# Patient Record
Sex: MALE | Race: WHITE | Employment: OTHER | URBAN - METROPOLITAN AREA
[De-identification: names, ages, dates, MRNs, and addresses within clinical notes are randomized per-mention and may not be internally consistent; named-entity substitution may affect disease eponyms.]

---

## 2017-01-05 RX ORDER — PANTOPRAZOLE SODIUM 20 MG/1
TABLET, DELAYED RELEASE ORAL
Qty: 30 TABLET | Refills: 0 | Status: SHIPPED | OUTPATIENT
Start: 2017-01-05 | End: 2017-03-09 | Stop reason: SDUPTHER

## 2017-01-06 ENCOUNTER — TELEPHONE (OUTPATIENT)
Dept: SURGERY | Age: 47
End: 2017-01-06

## 2017-02-14 ENCOUNTER — HOSPITAL ENCOUNTER (OUTPATIENT)
Dept: PREADMISSION TESTING | Age: 47
Discharge: HOME | End: 2017-02-14
Attending: SURGERY | Admitting: SURGERY

## 2017-02-14 VITALS — BODY MASS INDEX: 25.67 KG/M2 | HEIGHT: 74 IN | WEIGHT: 200 LBS

## 2017-02-14 RX ORDER — CIPROFLOXACIN 2 MG/ML
400 INJECTION, SOLUTION INTRAVENOUS ONCE
Status: CANCELLED | OUTPATIENT
Start: 2017-02-14

## 2017-02-21 ENCOUNTER — HOSPITAL ENCOUNTER (OUTPATIENT)
Dept: SURGERY | Age: 47
Discharge: HOME | End: 2017-02-21
Admitting: SURGERY

## 2017-02-21 VITALS
WEIGHT: 200 LBS | HEART RATE: 93 BPM | HEIGHT: 74 IN | DIASTOLIC BLOOD PRESSURE: 88 MMHG | TEMPERATURE: 97.3 F | OXYGEN SATURATION: 90 % | SYSTOLIC BLOOD PRESSURE: 145 MMHG | RESPIRATION RATE: 16 BRPM | BODY MASS INDEX: 25.67 KG/M2

## 2017-02-21 LAB
A/G RATIO: 0.9 (ref 1.1–2.2)
ALBUMIN SERPL-MCNC: 3.1 G/DL (ref 3.4–5)
ALP BLD-CCNC: 53 U/L (ref 40–129)
ALT SERPL-CCNC: 58 U/L (ref 10–40)
ANION GAP SERPL CALCULATED.3IONS-SCNC: 11 MMOL/L (ref 3–16)
AST SERPL-CCNC: 162 U/L (ref 15–37)
BILIRUB SERPL-MCNC: 4 MG/DL (ref 0–1)
BUN BLDV-MCNC: 19 MG/DL (ref 7–20)
CALCIUM SERPL-MCNC: 8.9 MG/DL (ref 8.3–10.6)
CHLORIDE BLD-SCNC: 106 MMOL/L (ref 99–110)
CO2: 21 MMOL/L (ref 21–32)
CREAT SERPL-MCNC: 0.8 MG/DL (ref 0.9–1.3)
GFR AFRICAN AMERICAN: >60
GFR NON-AFRICAN AMERICAN: >60
GLOBULIN: 3.3 G/DL
GLUCOSE BLD-MCNC: 86 MG/DL (ref 70–99)
HCT VFR BLD CALC: 34.2 % (ref 40.5–52.5)
HEMOGLOBIN: 11.4 G/DL (ref 13.5–17.5)
INR BLD: 1.23 (ref 0.85–1.15)
MCH RBC QN AUTO: 31.7 PG (ref 26–34)
MCHC RBC AUTO-ENTMCNC: 33.5 G/DL (ref 31–36)
MCV RBC AUTO: 94.6 FL (ref 80–100)
PDW BLD-RTO: 17.4 % (ref 12.4–15.4)
PLATELET # BLD: 101 K/UL (ref 135–450)
PMV BLD AUTO: 7 FL (ref 5–10.5)
POTASSIUM SERPL-SCNC: 4.1 MMOL/L (ref 3.5–5.1)
PROTHROMBIN TIME: 13.9 SEC (ref 9.6–13)
RBC # BLD: 3.62 M/UL (ref 4.2–5.9)
SODIUM BLD-SCNC: 138 MMOL/L (ref 136–145)
TOTAL PROTEIN: 6.4 G/DL (ref 6.4–8.2)
WBC # BLD: 4.9 K/UL (ref 4–11)

## 2017-02-21 PROCEDURE — 46615 ANOSCOPY: CPT | Performed by: SURGERY

## 2017-02-21 PROCEDURE — 93010 ELECTROCARDIOGRAM REPORT: CPT | Performed by: INTERNAL MEDICINE

## 2017-02-21 PROCEDURE — 46910 DESTRUCTION ANAL LESION(S): CPT | Performed by: SURGERY

## 2017-02-21 RX ORDER — CIPROFLOXACIN 2 MG/ML
400 INJECTION, SOLUTION INTRAVENOUS ONCE
Status: DISCONTINUED | OUTPATIENT
Start: 2017-02-21 | End: 2017-02-22 | Stop reason: HOSPADM

## 2017-02-21 RX ORDER — MIDAZOLAM HYDROCHLORIDE 1 MG/ML
INJECTION INTRAMUSCULAR; INTRAVENOUS
Status: COMPLETED
Start: 2017-02-21 | End: 2017-02-21

## 2017-02-21 RX ORDER — PROMETHAZINE HYDROCHLORIDE 25 MG/ML
6.25 INJECTION, SOLUTION INTRAMUSCULAR; INTRAVENOUS
Status: ACTIVE | OUTPATIENT
Start: 2017-02-21 | End: 2017-02-21

## 2017-02-21 RX ORDER — OXYCODONE HYDROCHLORIDE AND ACETAMINOPHEN 5; 325 MG/1; MG/1
1 TABLET ORAL EVERY 6 HOURS PRN
Qty: 35 TABLET | Refills: 0 | Status: SHIPPED | OUTPATIENT
Start: 2017-02-21 | End: 2017-03-07

## 2017-02-21 RX ORDER — ONDANSETRON 2 MG/ML
4 INJECTION INTRAMUSCULAR; INTRAVENOUS
Status: ACTIVE | OUTPATIENT
Start: 2017-02-21 | End: 2017-02-21

## 2017-02-21 RX ORDER — SODIUM CHLORIDE, SODIUM LACTATE, POTASSIUM CHLORIDE, CALCIUM CHLORIDE 600; 310; 30; 20 MG/100ML; MG/100ML; MG/100ML; MG/100ML
INJECTION, SOLUTION INTRAVENOUS CONTINUOUS
Status: DISCONTINUED | OUTPATIENT
Start: 2017-02-21 | End: 2017-02-22 | Stop reason: HOSPADM

## 2017-02-21 RX ORDER — DIPHENHYDRAMINE HYDROCHLORIDE 50 MG/ML
12.5 INJECTION INTRAMUSCULAR; INTRAVENOUS
Status: ACTIVE | OUTPATIENT
Start: 2017-02-21 | End: 2017-02-21

## 2017-02-21 RX ORDER — MIDAZOLAM HYDROCHLORIDE 1 MG/ML
2 INJECTION INTRAMUSCULAR; INTRAVENOUS ONCE
Status: COMPLETED | OUTPATIENT
Start: 2017-02-21 | End: 2017-02-21

## 2017-02-21 RX ORDER — HYDRALAZINE HYDROCHLORIDE 20 MG/ML
5 INJECTION INTRAMUSCULAR; INTRAVENOUS EVERY 10 MIN PRN
Status: DISCONTINUED | OUTPATIENT
Start: 2017-02-21 | End: 2017-02-22 | Stop reason: HOSPADM

## 2017-02-21 RX ORDER — LABETALOL HYDROCHLORIDE 5 MG/ML
5 INJECTION, SOLUTION INTRAVENOUS EVERY 10 MIN PRN
Status: DISCONTINUED | OUTPATIENT
Start: 2017-02-21 | End: 2017-02-22 | Stop reason: HOSPADM

## 2017-02-21 RX ORDER — DOCUSATE SODIUM 100 MG/1
100 CAPSULE, LIQUID FILLED ORAL 2 TIMES DAILY PRN
Qty: 60 CAPSULE | Refills: 1 | Status: SHIPPED | OUTPATIENT
Start: 2017-02-21 | End: 2018-09-10 | Stop reason: ALTCHOICE

## 2017-02-21 RX ORDER — FENTANYL CITRATE 50 UG/ML
25 INJECTION, SOLUTION INTRAMUSCULAR; INTRAVENOUS EVERY 5 MIN PRN
Status: DISCONTINUED | OUTPATIENT
Start: 2017-02-21 | End: 2017-02-22 | Stop reason: HOSPADM

## 2017-02-21 RX ORDER — MEPERIDINE HYDROCHLORIDE 25 MG/ML
12.5 INJECTION INTRAMUSCULAR; INTRAVENOUS; SUBCUTANEOUS EVERY 5 MIN PRN
Status: DISCONTINUED | OUTPATIENT
Start: 2017-02-21 | End: 2017-02-22 | Stop reason: HOSPADM

## 2017-02-21 RX ADMIN — SODIUM CHLORIDE, SODIUM LACTATE, POTASSIUM CHLORIDE, CALCIUM CHLORIDE: 600; 310; 30; 20 INJECTION, SOLUTION INTRAVENOUS at 08:39

## 2017-02-21 RX ADMIN — MIDAZOLAM HYDROCHLORIDE 2 MG: 1 INJECTION INTRAMUSCULAR; INTRAVENOUS at 08:43

## 2017-02-21 ASSESSMENT — PAIN SCALES - GENERAL: PAINLEVEL_OUTOF10: 0

## 2017-02-21 ASSESSMENT — PAIN - FUNCTIONAL ASSESSMENT: PAIN_FUNCTIONAL_ASSESSMENT: 0-10

## 2017-02-22 ENCOUNTER — OFFICE VISIT (OUTPATIENT)
Dept: NEUROLOGY | Age: 47
End: 2017-02-22

## 2017-02-22 VITALS
WEIGHT: 212 LBS | HEIGHT: 74 IN | DIASTOLIC BLOOD PRESSURE: 86 MMHG | BODY MASS INDEX: 27.21 KG/M2 | HEART RATE: 80 BPM | SYSTOLIC BLOOD PRESSURE: 134 MMHG

## 2017-02-22 DIAGNOSIS — G25.81 RESTLESS LEGS SYNDROME (RLS): Primary | ICD-10-CM

## 2017-02-22 DIAGNOSIS — B20 HUMAN IMMUNODEFICIENCY VIRUS (HIV) DISEASE (HCC): Chronic | ICD-10-CM

## 2017-02-22 DIAGNOSIS — G47.33 OBSTRUCTIVE APNEA: Chronic | ICD-10-CM

## 2017-02-22 LAB
EKG ATRIAL RATE: 97 BPM
EKG DIAGNOSIS: NORMAL
EKG P AXIS: 46 DEGREES
EKG P-R INTERVAL: 144 MS
EKG Q-T INTERVAL: 388 MS
EKG QRS DURATION: 94 MS
EKG QTC CALCULATION (BAZETT): 492 MS
EKG R AXIS: -27 DEGREES
EKG T AXIS: 44 DEGREES
EKG VENTRICULAR RATE: 97 BPM

## 2017-02-22 PROCEDURE — 99244 OFF/OP CNSLTJ NEW/EST MOD 40: CPT | Performed by: PSYCHIATRY & NEUROLOGY

## 2017-02-22 RX ORDER — GABAPENTIN 300 MG/1
300 CAPSULE ORAL NIGHTLY
Qty: 30 CAPSULE | Refills: 0 | Status: SHIPPED | OUTPATIENT
Start: 2017-02-22 | End: 2017-03-28 | Stop reason: SDUPTHER

## 2017-03-01 ENCOUNTER — OFFICE VISIT (OUTPATIENT)
Dept: SURGERY | Age: 47
End: 2017-03-01

## 2017-03-01 VITALS
DIASTOLIC BLOOD PRESSURE: 84 MMHG | SYSTOLIC BLOOD PRESSURE: 149 MMHG | HEIGHT: 74 IN | BODY MASS INDEX: 25.8 KG/M2 | HEART RATE: 102 BPM | WEIGHT: 201 LBS

## 2017-03-01 DIAGNOSIS — B20 HIV (HUMAN IMMUNODEFICIENCY VIRUS INFECTION) (HCC): Primary | ICD-10-CM

## 2017-03-01 PROCEDURE — 99024 POSTOP FOLLOW-UP VISIT: CPT | Performed by: SURGERY

## 2017-03-09 RX ORDER — PANTOPRAZOLE SODIUM 20 MG/1
TABLET, DELAYED RELEASE ORAL
Qty: 30 TABLET | Refills: 0 | Status: SHIPPED | OUTPATIENT
Start: 2017-03-09

## 2017-03-21 ENCOUNTER — HOSPITAL ENCOUNTER (OUTPATIENT)
Dept: CT IMAGING | Age: 47
Discharge: HOME | End: 2017-03-21
Attending: UROLOGY | Admitting: UROLOGY

## 2017-03-21 ENCOUNTER — OFFICE VISIT (OUTPATIENT)
Dept: SLEEP MEDICINE | Age: 47
End: 2017-03-21

## 2017-03-21 VITALS
WEIGHT: 207 LBS | DIASTOLIC BLOOD PRESSURE: 70 MMHG | OXYGEN SATURATION: 98 % | SYSTOLIC BLOOD PRESSURE: 130 MMHG | BODY MASS INDEX: 26.56 KG/M2 | HEIGHT: 74 IN | HEART RATE: 93 BPM

## 2017-03-21 DIAGNOSIS — I48.91 ATRIAL FIBRILLATION WITH RVR (HCC): Chronic | ICD-10-CM

## 2017-03-21 DIAGNOSIS — G47.33 OBSTRUCTIVE SLEEP APNEA SYNDROME: Primary | Chronic | ICD-10-CM

## 2017-03-21 DIAGNOSIS — G47.00 INSOMNIA, UNSPECIFIED TYPE: Chronic | ICD-10-CM

## 2017-03-21 DIAGNOSIS — N20.0 URIC ACID NEPHROLITHIASIS: ICD-10-CM

## 2017-03-21 DIAGNOSIS — G25.81 RLS (RESTLESS LEGS SYNDROME): Chronic | ICD-10-CM

## 2017-03-21 PROCEDURE — 99213 OFFICE O/P EST LOW 20 MIN: CPT | Performed by: INTERNAL MEDICINE

## 2017-03-21 ASSESSMENT — ENCOUNTER SYMPTOMS
PHOTOPHOBIA: 0
SHORTNESS OF BREATH: 0
STRIDOR: 0
CHEST TIGHTNESS: 0
SINUS PRESSURE: 0
APNEA: 1
EYE PAIN: 0

## 2017-03-21 ASSESSMENT — SLEEP AND FATIGUE QUESTIONNAIRES
HOW LIKELY ARE YOU TO NOD OFF OR FALL ASLEEP WHILE LYING DOWN TO REST IN THE AFTERNOON WHEN CIRCUMSTANCES PERMIT: 2
HOW LIKELY ARE YOU TO NOD OFF OR FALL ASLEEP WHILE WATCHING TV: 1
HOW LIKELY ARE YOU TO NOD OFF OR FALL ASLEEP IN A CAR, WHILE STOPPED FOR A FEW MINUTES IN TRAFFIC: 0
HOW LIKELY ARE YOU TO NOD OFF OR FALL ASLEEP WHILE SITTING QUIETLY AFTER LUNCH WITHOUT ALCOHOL: 0
HOW LIKELY ARE YOU TO NOD OFF OR FALL ASLEEP WHILE SITTING AND READING: 1
HOW LIKELY ARE YOU TO NOD OFF OR FALL ASLEEP WHEN YOU ARE A PASSENGER IN A CAR FOR AN HOUR WITHOUT A BREAK: 0
ESS TOTAL SCORE: 4
HOW LIKELY ARE YOU TO NOD OFF OR FALL ASLEEP WHILE SITTING INACTIVE IN A PUBLIC PLACE: 0
HOW LIKELY ARE YOU TO NOD OFF OR FALL ASLEEP WHILE SITTING AND TALKING TO SOMEONE: 0

## 2017-03-24 ENCOUNTER — PROCEDURE VISIT (OUTPATIENT)
Dept: NEUROLOGY | Age: 47
End: 2017-03-24

## 2017-03-24 DIAGNOSIS — G60.9 IDIOPATHIC PERIPHERAL NEUROPATHY: Primary | ICD-10-CM

## 2017-03-24 PROCEDURE — 95886 MUSC TEST DONE W/N TEST COMP: CPT | Performed by: PSYCHIATRY & NEUROLOGY

## 2017-03-24 PROCEDURE — 95910 NRV CNDJ TEST 7-8 STUDIES: CPT | Performed by: PSYCHIATRY & NEUROLOGY

## 2017-04-05 ENCOUNTER — TELEPHONE (OUTPATIENT)
Dept: NEUROLOGY | Age: 47
End: 2017-04-05

## 2017-04-28 ENCOUNTER — OFFICE VISIT (OUTPATIENT)
Dept: NEUROLOGY | Age: 47
End: 2017-04-28

## 2017-04-28 VITALS
WEIGHT: 200.4 LBS | SYSTOLIC BLOOD PRESSURE: 140 MMHG | BODY MASS INDEX: 25.72 KG/M2 | DIASTOLIC BLOOD PRESSURE: 86 MMHG | HEIGHT: 74 IN

## 2017-04-28 DIAGNOSIS — G25.81 RESTLESS LEGS SYNDROME (RLS): ICD-10-CM

## 2017-04-28 DIAGNOSIS — F51.01 PRIMARY INSOMNIA: ICD-10-CM

## 2017-04-28 DIAGNOSIS — B20 HUMAN IMMUNODEFICIENCY VIRUS (HIV) DISEASE (HCC): Chronic | ICD-10-CM

## 2017-04-28 DIAGNOSIS — G60.9 IDIOPATHIC PERIPHERAL NEUROPATHY: Primary | ICD-10-CM

## 2017-04-28 PROCEDURE — 99214 OFFICE O/P EST MOD 30 MIN: CPT | Performed by: PSYCHIATRY & NEUROLOGY

## 2017-04-28 RX ORDER — GABAPENTIN 300 MG/1
CAPSULE ORAL
Qty: 30 CAPSULE | Refills: 2 | Status: SHIPPED | OUTPATIENT
Start: 2017-04-28 | End: 2017-06-26

## 2017-05-23 ENCOUNTER — OFFICE VISIT (OUTPATIENT)
Dept: SLEEP MEDICINE | Age: 47
End: 2017-05-23

## 2017-05-23 VITALS
HEIGHT: 74 IN | OXYGEN SATURATION: 98 % | HEART RATE: 75 BPM | DIASTOLIC BLOOD PRESSURE: 70 MMHG | WEIGHT: 192 LBS | BODY MASS INDEX: 24.64 KG/M2 | SYSTOLIC BLOOD PRESSURE: 132 MMHG

## 2017-05-23 DIAGNOSIS — G47.00 INSOMNIA, UNSPECIFIED TYPE: Chronic | ICD-10-CM

## 2017-05-23 DIAGNOSIS — G25.81 RLS (RESTLESS LEGS SYNDROME): Chronic | ICD-10-CM

## 2017-05-23 DIAGNOSIS — I48.91 ATRIAL FIBRILLATION WITH RVR (HCC): Chronic | ICD-10-CM

## 2017-05-23 DIAGNOSIS — G47.33 OBSTRUCTIVE SLEEP APNEA SYNDROME: Primary | Chronic | ICD-10-CM

## 2017-05-23 PROCEDURE — 99214 OFFICE O/P EST MOD 30 MIN: CPT | Performed by: INTERNAL MEDICINE

## 2017-05-23 ASSESSMENT — SLEEP AND FATIGUE QUESTIONNAIRES
HOW LIKELY ARE YOU TO NOD OFF OR FALL ASLEEP WHILE SITTING QUIETLY AFTER LUNCH WITHOUT ALCOHOL: 0
HOW LIKELY ARE YOU TO NOD OFF OR FALL ASLEEP WHILE SITTING AND READING: 1
HOW LIKELY ARE YOU TO NOD OFF OR FALL ASLEEP WHILE LYING DOWN TO REST IN THE AFTERNOON WHEN CIRCUMSTANCES PERMIT: 3
HOW LIKELY ARE YOU TO NOD OFF OR FALL ASLEEP WHILE SITTING INACTIVE IN A PUBLIC PLACE: 0
ESS TOTAL SCORE: 6
HOW LIKELY ARE YOU TO NOD OFF OR FALL ASLEEP IN A CAR, WHILE STOPPED FOR A FEW MINUTES IN TRAFFIC: 0
HOW LIKELY ARE YOU TO NOD OFF OR FALL ASLEEP WHEN YOU ARE A PASSENGER IN A CAR FOR AN HOUR WITHOUT A BREAK: 0
HOW LIKELY ARE YOU TO NOD OFF OR FALL ASLEEP WHILE WATCHING TV: 2
HOW LIKELY ARE YOU TO NOD OFF OR FALL ASLEEP WHILE SITTING AND TALKING TO SOMEONE: 0

## 2017-05-23 ASSESSMENT — ENCOUNTER SYMPTOMS
SHORTNESS OF BREATH: 0
STRIDOR: 0
PHOTOPHOBIA: 0
CHEST TIGHTNESS: 0
SINUS PRESSURE: 0
EYE PAIN: 0

## 2017-06-26 ENCOUNTER — TELEPHONE (OUTPATIENT)
Dept: SLEEP MEDICINE | Age: 47
End: 2017-06-26

## 2017-06-26 RX ORDER — GABAPENTIN 300 MG/1
CAPSULE ORAL
Qty: 90 CAPSULE | Refills: 5 | Status: SHIPPED | OUTPATIENT
Start: 2017-06-26

## 2017-07-28 ENCOUNTER — OFFICE VISIT (OUTPATIENT)
Dept: NEUROLOGY | Age: 47
End: 2017-07-28

## 2017-07-28 VITALS
HEART RATE: 84 BPM | DIASTOLIC BLOOD PRESSURE: 86 MMHG | SYSTOLIC BLOOD PRESSURE: 140 MMHG | HEIGHT: 74 IN | BODY MASS INDEX: 25.93 KG/M2 | WEIGHT: 202 LBS

## 2017-07-28 DIAGNOSIS — G63 POLYNEUROPATHY ASSOCIATED WITH UNDERLYING DISEASE (HCC): Primary | ICD-10-CM

## 2017-07-28 DIAGNOSIS — G47.33 OBSTRUCTIVE APNEA: Chronic | ICD-10-CM

## 2017-07-28 DIAGNOSIS — B20 HUMAN IMMUNODEFICIENCY VIRUS (HIV) DISEASE (HCC): Chronic | ICD-10-CM

## 2017-07-28 PROCEDURE — 99213 OFFICE O/P EST LOW 20 MIN: CPT | Performed by: PSYCHIATRY & NEUROLOGY

## 2017-09-11 ENCOUNTER — OFFICE VISIT (OUTPATIENT)
Dept: SURGERY | Age: 47
End: 2017-09-11

## 2017-09-11 VITALS
SYSTOLIC BLOOD PRESSURE: 138 MMHG | DIASTOLIC BLOOD PRESSURE: 80 MMHG | HEIGHT: 74 IN | BODY MASS INDEX: 25.76 KG/M2 | WEIGHT: 200.7 LBS

## 2017-09-11 DIAGNOSIS — A63.0 CONDYLOMA: Primary | ICD-10-CM

## 2017-09-11 PROCEDURE — 99212 OFFICE O/P EST SF 10 MIN: CPT | Performed by: SURGERY

## 2017-09-19 ENCOUNTER — OFFICE VISIT (OUTPATIENT)
Dept: SLEEP MEDICINE | Age: 47
End: 2017-09-19

## 2017-09-19 VITALS
WEIGHT: 205 LBS | DIASTOLIC BLOOD PRESSURE: 68 MMHG | HEART RATE: 99 BPM | OXYGEN SATURATION: 97 % | HEIGHT: 74 IN | SYSTOLIC BLOOD PRESSURE: 130 MMHG | BODY MASS INDEX: 26.31 KG/M2

## 2017-09-19 DIAGNOSIS — G25.81 RLS (RESTLESS LEGS SYNDROME): Chronic | ICD-10-CM

## 2017-09-19 DIAGNOSIS — G47.00 INSOMNIA, UNSPECIFIED TYPE: Chronic | ICD-10-CM

## 2017-09-19 DIAGNOSIS — G47.33 OBSTRUCTIVE SLEEP APNEA SYNDROME: Primary | Chronic | ICD-10-CM

## 2017-09-19 DIAGNOSIS — I48.91 ATRIAL FIBRILLATION WITH RVR (HCC): Chronic | ICD-10-CM

## 2017-09-19 PROCEDURE — 99213 OFFICE O/P EST LOW 20 MIN: CPT | Performed by: INTERNAL MEDICINE

## 2017-09-19 ASSESSMENT — ENCOUNTER SYMPTOMS
SHORTNESS OF BREATH: 0
EYE PAIN: 0
PHOTOPHOBIA: 0
SINUS PRESSURE: 0
STRIDOR: 0
CHEST TIGHTNESS: 0

## 2017-09-19 ASSESSMENT — SLEEP AND FATIGUE QUESTIONNAIRES
ESS TOTAL SCORE: 2
HOW LIKELY ARE YOU TO NOD OFF OR FALL ASLEEP IN A CAR, WHILE STOPPED FOR A FEW MINUTES IN TRAFFIC: 0
HOW LIKELY ARE YOU TO NOD OFF OR FALL ASLEEP WHEN YOU ARE A PASSENGER IN A CAR FOR AN HOUR WITHOUT A BREAK: 0
HOW LIKELY ARE YOU TO NOD OFF OR FALL ASLEEP WHILE SITTING QUIETLY AFTER LUNCH WITHOUT ALCOHOL: 0
HOW LIKELY ARE YOU TO NOD OFF OR FALL ASLEEP WHILE SITTING AND TALKING TO SOMEONE: 0
HOW LIKELY ARE YOU TO NOD OFF OR FALL ASLEEP WHILE WATCHING TV: 1
HOW LIKELY ARE YOU TO NOD OFF OR FALL ASLEEP WHILE SITTING AND READING: 0
HOW LIKELY ARE YOU TO NOD OFF OR FALL ASLEEP WHILE SITTING INACTIVE IN A PUBLIC PLACE: 0
HOW LIKELY ARE YOU TO NOD OFF OR FALL ASLEEP WHILE LYING DOWN TO REST IN THE AFTERNOON WHEN CIRCUMSTANCES PERMIT: 1

## 2018-01-16 ENCOUNTER — OFFICE VISIT (OUTPATIENT)
Dept: SLEEP MEDICINE | Age: 48
End: 2018-01-16

## 2018-01-16 VITALS
WEIGHT: 205 LBS | OXYGEN SATURATION: 97 % | DIASTOLIC BLOOD PRESSURE: 70 MMHG | BODY MASS INDEX: 26.31 KG/M2 | SYSTOLIC BLOOD PRESSURE: 119 MMHG | HEART RATE: 91 BPM | HEIGHT: 74 IN

## 2018-01-16 DIAGNOSIS — I48.91 ATRIAL FIBRILLATION WITH RVR (HCC): Chronic | ICD-10-CM

## 2018-01-16 DIAGNOSIS — G47.00 INSOMNIA, UNSPECIFIED TYPE: Chronic | ICD-10-CM

## 2018-01-16 DIAGNOSIS — G47.33 OBSTRUCTIVE SLEEP APNEA SYNDROME: Primary | Chronic | ICD-10-CM

## 2018-01-16 DIAGNOSIS — G25.81 RLS (RESTLESS LEGS SYNDROME): Chronic | ICD-10-CM

## 2018-01-16 PROCEDURE — 99214 OFFICE O/P EST MOD 30 MIN: CPT | Performed by: INTERNAL MEDICINE

## 2018-01-16 RX ORDER — DOXEPIN HYDROCHLORIDE 6 MG/1
6 TABLET ORAL EVERY EVENING
Qty: 30 TABLET | Refills: 2 | Status: SHIPPED | OUTPATIENT
Start: 2018-01-16 | End: 2018-02-15

## 2018-01-16 ASSESSMENT — SLEEP AND FATIGUE QUESTIONNAIRES
HOW LIKELY ARE YOU TO NOD OFF OR FALL ASLEEP WHILE SITTING AND TALKING TO SOMEONE: 0
HOW LIKELY ARE YOU TO NOD OFF OR FALL ASLEEP WHILE SITTING INACTIVE IN A PUBLIC PLACE: 2
HOW LIKELY ARE YOU TO NOD OFF OR FALL ASLEEP WHILE WATCHING TV: 2
HOW LIKELY ARE YOU TO NOD OFF OR FALL ASLEEP IN A CAR, WHILE STOPPED FOR A FEW MINUTES IN TRAFFIC: 0
HOW LIKELY ARE YOU TO NOD OFF OR FALL ASLEEP WHILE LYING DOWN TO REST IN THE AFTERNOON WHEN CIRCUMSTANCES PERMIT: 3
HOW LIKELY ARE YOU TO NOD OFF OR FALL ASLEEP WHEN YOU ARE A PASSENGER IN A CAR FOR AN HOUR WITHOUT A BREAK: 1
HOW LIKELY ARE YOU TO NOD OFF OR FALL ASLEEP WHILE SITTING AND READING: 1
HOW LIKELY ARE YOU TO NOD OFF OR FALL ASLEEP WHILE SITTING QUIETLY AFTER LUNCH WITHOUT ALCOHOL: 2
ESS TOTAL SCORE: 11

## 2018-01-16 ASSESSMENT — ENCOUNTER SYMPTOMS
EYE PAIN: 0
SHORTNESS OF BREATH: 0
PHOTOPHOBIA: 0
APNEA: 1
ABDOMINAL DISTENTION: 0
SINUS PRESSURE: 0
STRIDOR: 0
ABDOMINAL PAIN: 0
CHEST TIGHTNESS: 0
NAUSEA: 0

## 2018-01-16 NOTE — PROGRESS NOTES
Sonya Damon MD, FAA, [facility]  Perla Newell Lover CNP [address]  3rd Floor, [address] [facility], [address] [phone]   [address] [place] [address] Dr Cid . [address] [phone]       [facility] Abiel Pinto SLEEP MEDICINE    Subjective:     Patient ID: Uvaldo Bishop is a 50 y.o. male. Chief Complaint   Patient presents with    Sleep Apnea       HPI:      MARIA M: still not on any therapy for his MARIA M and not willing to try PAP therapy again. Pt heard ads for [employer] and would like to get more info. Insomnia:  Still taking 600 mg neurontin which is not working as well as before. RLS:  Still taking 600 mg neurontin but feels it is not controlling his Sx as previous and now having Sx during the daytime. A fib: stable on meds and followed by pt's PCP and other physicians. [place] - Total score: 11    Social History     Social History    Marital status:      Spouse name: N/A    Number of children: 3    Years of education: N/A     Occupational History    retired      Social History Main Topics    Smoking status: Never Smoker    Smokeless tobacco: Never Used    Alcohol use Yes      Comment: once a month    Drug use: No    Sexual activity: Not on file     Other Topics Concern    Not on file     Social History Narrative    No narrative on file       Prior to Admission medications    Medication Sig Start Date End Date Taking?  Authorizing Provider   emtricitabine-tenofovir alafenamide (DESCOVY) 200-25 MG TABS tablet TAKE ONE TABLET BY MOUTH ONCE DAILY (REPLACES TRUVADA) 10/27/17  Yes Historical Provider, MD   gabapentin (NEURONTIN) 300 MG capsule 2-3 po qHS 6/26/17  Yes Suhail Renteria MD   pantoprazole (PROTONIX) 20 MG tablet TAKE ONE TABLET BY MOUTH DAILY 3/9/17  Yes Shiva Leonardo MD   docusate sodium (COLACE) 100 MG capsule Take 1 capsule by mouth 2 times daily as needed for Constipation 2/21/17  Yes Jax Bueno

## 2018-03-12 ENCOUNTER — OFFICE VISIT (OUTPATIENT)
Dept: SURGERY | Age: 48
End: 2018-03-12

## 2018-03-12 VITALS
HEIGHT: 74 IN | RESPIRATION RATE: 16 BRPM | DIASTOLIC BLOOD PRESSURE: 76 MMHG | WEIGHT: 208 LBS | SYSTOLIC BLOOD PRESSURE: 144 MMHG | BODY MASS INDEX: 26.69 KG/M2

## 2018-03-12 DIAGNOSIS — K62.82 AIN (ANAL INTRAEPITHELIAL NEOPLASIA) ANAL CANAL: Primary | ICD-10-CM

## 2018-03-12 PROCEDURE — 99212 OFFICE O/P EST SF 10 MIN: CPT | Performed by: SURGERY

## 2018-03-12 NOTE — PROGRESS NOTES
Subjective:     Patient is a 50 y.o. Man with AIN     HPI: No rectal lesions or bleeding noted. Hep C now thought to be cured but still with cirrhosis.      Patient Active Problem List    Diagnosis Date Noted    Human immunodeficiency virus (HIV) disease ([address] 04/10/2013     Priority: High    AIN (anal intraepithelial neoplasia) anal canal     Influenza 03/16/2016    Pneumonia 03/16/2016    Influenza A     SIRS (systemic inflammatory response syndrome) (HCC)     HIV (human immunodeficiency virus infection) ([address]     Elevated LFTs     Pancytopenia ([address]     Obstructive apnea     Distal radius fracture 11/03/2014    Atrial fibrillation with RVR ([address] 07/04/2014    Penicillin allergy 03/25/2014    Syphilis 03/25/2014    Neutropenia with fever ([address] 12/07/2013    Sepsis ([address] 12/07/2013    Depression 04/10/2013    Insomnia 04/10/2013    Nephrolithiasis 04/10/2013    Condyloma acuminata 04/10/2013    Fatigue 04/10/2013     Past Medical History:   Diagnosis Date    A-fib ([address] JULY 4 2014    POSSIBLE REACTION TO A MEDICATION    Depression     Hepatitis C     HIV (human immunodeficiency virus infection) ([address]     Hx of blood clots     x1    Influenza A 3/16/16    Kidney stones     Numbness and tingling in left arm     Pulmonary embolism ([address] 2007    Left lung    Sleep apnea     under going studies not definite    Syphilis, secondary     Thoracic outlet syndrome       Past Surgical History:   Procedure Laterality Date    ELBOW SURGERY Right     ULNAR NERVE    FIRST RIB REMOVAL      and extra rib removed    KNEE ARTHROSCOPY Left     OTHER SURGICAL HISTORY  06/03/2014    EXAMINATION UNDER ANESTHESIA AND FULGURATION OF ANAL    OTHER SURGICAL HISTORY  10/7/14    EXAMINATION UNDER ANESTHESIA, ANAL BIOPSY    OTHER SURGICAL HISTORY N/A 10/29/2015    RECTAL EXAMINATION UNDER ANESTHESIA AND RANDOM BIOPSIES    OTHER SURGICAL HISTORY  02/21/2017    EXAMNINATION UNDER ANESTHESIA , ANAL BIOPSIES,

## 2018-04-17 ENCOUNTER — OFFICE VISIT (OUTPATIENT)
Dept: SLEEP MEDICINE | Age: 48
End: 2018-04-17

## 2018-04-17 VITALS
OXYGEN SATURATION: 98 % | WEIGHT: 204 LBS | HEIGHT: 74 IN | SYSTOLIC BLOOD PRESSURE: 133 MMHG | DIASTOLIC BLOOD PRESSURE: 72 MMHG | BODY MASS INDEX: 26.18 KG/M2 | HEART RATE: 92 BPM

## 2018-04-17 DIAGNOSIS — G25.81 RLS (RESTLESS LEGS SYNDROME): ICD-10-CM

## 2018-04-17 DIAGNOSIS — G47.33 OBSTRUCTIVE APNEA: Primary | Chronic | ICD-10-CM

## 2018-04-17 DIAGNOSIS — I48.91 ATRIAL FIBRILLATION WITH RVR (HCC): Chronic | ICD-10-CM

## 2018-04-17 DIAGNOSIS — F51.04 PSYCHOPHYSIOLOGICAL INSOMNIA: Chronic | ICD-10-CM

## 2018-04-17 PROCEDURE — 99213 OFFICE O/P EST LOW 20 MIN: CPT | Performed by: INTERNAL MEDICINE

## 2018-04-17 ASSESSMENT — SLEEP AND FATIGUE QUESTIONNAIRES
HOW LIKELY ARE YOU TO NOD OFF OR FALL ASLEEP WHILE SITTING AND TALKING TO SOMEONE: 1
HOW LIKELY ARE YOU TO NOD OFF OR FALL ASLEEP WHILE SITTING INACTIVE IN A PUBLIC PLACE: 2
HOW LIKELY ARE YOU TO NOD OFF OR FALL ASLEEP WHILE LYING DOWN TO REST IN THE AFTERNOON WHEN CIRCUMSTANCES PERMIT: 2
HOW LIKELY ARE YOU TO NOD OFF OR FALL ASLEEP WHILE SITTING QUIETLY AFTER LUNCH WITHOUT ALCOHOL: 2
HOW LIKELY ARE YOU TO NOD OFF OR FALL ASLEEP WHILE SITTING AND READING: 2
HOW LIKELY ARE YOU TO NOD OFF OR FALL ASLEEP IN A CAR, WHILE STOPPED FOR A FEW MINUTES IN TRAFFIC: 0
ESS TOTAL SCORE: 13
HOW LIKELY ARE YOU TO NOD OFF OR FALL ASLEEP WHEN YOU ARE A PASSENGER IN A CAR FOR AN HOUR WITHOUT A BREAK: 2
HOW LIKELY ARE YOU TO NOD OFF OR FALL ASLEEP WHILE WATCHING TV: 2

## 2018-04-17 ASSESSMENT — ENCOUNTER SYMPTOMS
STRIDOR: 0
EYE PAIN: 0
CHEST TIGHTNESS: 0
PHOTOPHOBIA: 0
SINUS PRESSURE: 0
SHORTNESS OF BREATH: 0

## 2018-09-10 ENCOUNTER — OFFICE VISIT (OUTPATIENT)
Dept: SURGERY | Age: 48
End: 2018-09-10

## 2018-09-10 VITALS
DIASTOLIC BLOOD PRESSURE: 68 MMHG | HEIGHT: 74 IN | RESPIRATION RATE: 16 BRPM | WEIGHT: 204 LBS | SYSTOLIC BLOOD PRESSURE: 130 MMHG | BODY MASS INDEX: 26.18 KG/M2

## 2018-09-10 DIAGNOSIS — T21.65XA: Primary | ICD-10-CM

## 2018-09-10 PROCEDURE — 99213 OFFICE O/P EST LOW 20 MIN: CPT | Performed by: SURGERY
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
## 2018-10-16 ENCOUNTER — OFFICE VISIT (OUTPATIENT)
Dept: PULMONOLOGY | Age: 48
End: 2018-10-16
Payer: COMMERCIAL

## 2018-10-16 VITALS
WEIGHT: 198 LBS | BODY MASS INDEX: 25.41 KG/M2 | HEART RATE: 107 BPM | DIASTOLIC BLOOD PRESSURE: 88 MMHG | HEIGHT: 74 IN | SYSTOLIC BLOOD PRESSURE: 131 MMHG | OXYGEN SATURATION: 96 %

## 2018-10-16 DIAGNOSIS — G25.81 RLS (RESTLESS LEGS SYNDROME): Chronic | ICD-10-CM

## 2018-10-16 DIAGNOSIS — F51.04 PSYCHOPHYSIOLOGICAL INSOMNIA: Chronic | ICD-10-CM

## 2018-10-16 DIAGNOSIS — I48.91 ATRIAL FIBRILLATION WITH RVR (HCC): Chronic | ICD-10-CM

## 2018-10-16 DIAGNOSIS — G47.33 OBSTRUCTIVE APNEA: Chronic | ICD-10-CM

## 2018-10-16 PROCEDURE — 99214 OFFICE O/P EST MOD 30 MIN: CPT | Performed by: INTERNAL MEDICINE

## 2018-10-16 ASSESSMENT — SLEEP AND FATIGUE QUESTIONNAIRES
HOW LIKELY ARE YOU TO NOD OFF OR FALL ASLEEP WHILE LYING DOWN TO REST IN THE AFTERNOON WHEN CIRCUMSTANCES PERMIT: 3
HOW LIKELY ARE YOU TO NOD OFF OR FALL ASLEEP WHILE SITTING AND TALKING TO SOMEONE: 0
HOW LIKELY ARE YOU TO NOD OFF OR FALL ASLEEP WHILE SITTING INACTIVE IN A PUBLIC PLACE: 2
HOW LIKELY ARE YOU TO NOD OFF OR FALL ASLEEP WHILE WATCHING TV: 2
ESS TOTAL SCORE: 13
HOW LIKELY ARE YOU TO NOD OFF OR FALL ASLEEP WHILE SITTING QUIETLY AFTER LUNCH WITHOUT ALCOHOL: 1
HOW LIKELY ARE YOU TO NOD OFF OR FALL ASLEEP WHEN YOU ARE A PASSENGER IN A CAR FOR AN HOUR WITHOUT A BREAK: 3
HOW LIKELY ARE YOU TO NOD OFF OR FALL ASLEEP IN A CAR, WHILE STOPPED FOR A FEW MINUTES IN TRAFFIC: 0
HOW LIKELY ARE YOU TO NOD OFF OR FALL ASLEEP WHILE SITTING AND READING: 2

## 2018-10-16 ASSESSMENT — ENCOUNTER SYMPTOMS
STRIDOR: 0
SINUS PRESSURE: 0
PHOTOPHOBIA: 0
CHEST TIGHTNESS: 0
EYE PAIN: 0
SHORTNESS OF BREATH: 0

## 2018-10-16 NOTE — PROGRESS NOTES
Iker Winston MD, FAA, [facility]  [facility]  3rd Floor, [address], [address] [phone]   [address] [place] [address] Dr Roosevelt Quinonez . Jayme Del Toro [address] [phone] [address] [facility]  [address] [address] [phone]    Assessment/Plan:     Obstructive apnea  Chronic - untreated:  Continue medications per his PCP and other physicians. He instructed not to drive unless had 4 hrs of effective therapy for his MARIA M the night before. Did review the risks of under or untreated MARIA M including, but not limited to, higher risks of motor vehicle accidents, stroke, heart attacks, and death. He understands and accepts all these risks. Pt has not [facility] up with [facility] for eval for inspire and has been encouraged to do it sooner than latter. Atrial fibrillation with RVR (HCC)  Chronic- Stable. Cont meds per PCP and other physicians. Insomnia  Chronic- Stable but not controlled. Patient now seeing pain love. Will hold on further meds at this time. Has tried several agents that have not worked or caused side effects. Discussed that the retro-viral all cause insomnia. RLS (restless legs syndrome)  Chronic- Stable. Cont neurontin 600 mg total for now till follows up with neuro. Diagnoses of Obstructive apnea, Atrial fibrillation with RVR ([address] Psychophysiological insomnia, and RLS (restless legs syndrome) were pertinent to this visit. The chronic medical conditions listed are directly related to the primary diagnosis listed above. The management of the primary diagnosis affects the secondary diagnosis and vice versa. Subjective:     Patient ID: Aidee Urbina is a 50 y.o. male. Chief Complaint   Patient presents with    Daytime Sleepiness     Subjective   HPI:    MARIA M: Still not on effective Tx for his Obstructive Sleep Apnea.  Has not made Syphilis    Atrial fibrillation with RVR (HCC)    Distal radius fracture    Obstructive apnea    Influenza    Pneumonia    SIRS (systemic inflammatory response syndrome) (HCC)    HIV (human immunodeficiency virus infection) (HCC)    Elevated LFTs    Pancytopenia (HCC)    AIN (anal intraepithelial neoplasia) anal canal    RLS (restless legs syndrome)       Past Medical History:   Diagnosis Date    A-fib ([facility] JULY 4 2014    POSSIBLE REACTION TO A MEDICATION    Depression     Hepatitis C     HIV (human immunodeficiency virus infection) ([facility]     Hx of blood clots     x1    Influenza A 3/16/16    Kidney stones     Numbness and tingling in left arm     Pulmonary embolism ([facility] 2007    Left lung    Sleep apnea     under going studies not definite    Syphilis, secondary     Thoracic outlet syndrome        Past Surgical History:   Procedure Laterality Date    ELBOW SURGERY Right     ULNAR NERVE    FIRST RIB REMOVAL      and extra rib removed    KNEE ARTHROSCOPY Left     OTHER SURGICAL HISTORY  06/03/2014    EXAMINATION UNDER ANESTHESIA AND FULGURATION OF ANAL    OTHER SURGICAL HISTORY  10/7/14    EXAMINATION UNDER ANESTHESIA, ANAL BIOPSY    OTHER SURGICAL HISTORY N/A 10/29/2015    RECTAL EXAMINATION UNDER ANESTHESIA AND RANDOM BIOPSIES    OTHER SURGICAL HISTORY  02/21/2017    EXAMNINATION UNDER ANESTHESIA , ANAL BIOPSIES, ANAL ABLATION    [place] REPAIR ROTATOR CUFF,ACUTE Bilateral     Rotator Cuff Repair    RECTAL SURGERY  01/27/2015    RECTAL EXAM UNDER ANESTHESIA AND BIOPSY    SHOULDER SURGERY      bilateral for torn rotator cuffs    SINUS SURGERY      WISDOM TOOTH EXTRACTION      WRIST FRACTURE SURGERY Left 10/28/2014       Family History   Problem Relation Age of Onset    Stroke Father     Prostate Cancer Father     Heart Disease Brother            ROS:  Review of Systems   Constitutional: Positive for fatigue.    HENT: Negative for dental problem, ear pain, nosebleeds, sinus pressure and sneezing. Eyes: Negative for photophobia, pain and visual disturbance. Respiratory: Negative for chest tightness, shortness of breath and stridor. Cardiovascular: Negative for chest pain, palpitations and leg swelling. Musculoskeletal: Negative for neck pain and neck stiffness. Psychiatric/Behavioral: Positive for sleep disturbance. Vitals:  Weight BMI   Wt Readings from Last 3 Encounters:   10/16/18 198 lb (89.8 kg)   09/10/18 204 lb (92.5 kg)   04/17/18 204 lb (92.5 kg)    Body mass index is 25.42 kg/m².      BP HR SaO2   BP Readings from Last 3 Encounters:   10/16/18 131/88   09/10/18 130/68   04/17/18 133/72    Pulse Readings from Last 3 Encounters:   10/16/18 107   04/17/18 92   01/16/18 91    SpO2 Readings from Last 3 Encounters:   10/16/18 96%   04/17/18 98%   01/16/18 97%          Electronically signed by Lizbet Delgado MD on 10/16/2018 at 2:18 PM

## 2019-04-16 ENCOUNTER — OFFICE VISIT (OUTPATIENT)
Dept: PULMONOLOGY | Age: 49
End: 2019-04-16
Payer: COMMERCIAL

## 2019-04-16 VITALS
OXYGEN SATURATION: 98 % | WEIGHT: 203 LBS | DIASTOLIC BLOOD PRESSURE: 80 MMHG | BODY MASS INDEX: 26.05 KG/M2 | HEIGHT: 74 IN | HEART RATE: 95 BPM | SYSTOLIC BLOOD PRESSURE: 138 MMHG

## 2019-04-16 DIAGNOSIS — I48.91 ATRIAL FIBRILLATION WITH RVR (HCC): Chronic | ICD-10-CM

## 2019-04-16 DIAGNOSIS — F51.04 PSYCHOPHYSIOLOGICAL INSOMNIA: Chronic | ICD-10-CM

## 2019-04-16 DIAGNOSIS — G47.33 OBSTRUCTIVE APNEA: Chronic | ICD-10-CM

## 2019-04-16 DIAGNOSIS — G25.81 RLS (RESTLESS LEGS SYNDROME): Chronic | ICD-10-CM

## 2019-04-16 PROCEDURE — 99214 OFFICE O/P EST MOD 30 MIN: CPT | Performed by: INTERNAL MEDICINE

## 2019-04-16 ASSESSMENT — SLEEP AND FATIGUE QUESTIONNAIRES
HOW LIKELY ARE YOU TO NOD OFF OR FALL ASLEEP WHEN YOU ARE A PASSENGER IN A CAR FOR AN HOUR WITHOUT A BREAK: 0
HOW LIKELY ARE YOU TO NOD OFF OR FALL ASLEEP WHILE SITTING AND TALKING TO SOMEONE: 0
HOW LIKELY ARE YOU TO NOD OFF OR FALL ASLEEP WHILE SITTING INACTIVE IN A PUBLIC PLACE: 0
HOW LIKELY ARE YOU TO NOD OFF OR FALL ASLEEP WHILE WATCHING TV: 2
HOW LIKELY ARE YOU TO NOD OFF OR FALL ASLEEP WHILE LYING DOWN TO REST IN THE AFTERNOON WHEN CIRCUMSTANCES PERMIT: 2
HOW LIKELY ARE YOU TO NOD OFF OR FALL ASLEEP IN A CAR, WHILE STOPPED FOR A FEW MINUTES IN TRAFFIC: 0
HOW LIKELY ARE YOU TO NOD OFF OR FALL ASLEEP WHILE SITTING QUIETLY AFTER LUNCH WITHOUT ALCOHOL: 0
HOW LIKELY ARE YOU TO NOD OFF OR FALL ASLEEP WHILE SITTING AND READING: 1
ESS TOTAL SCORE: 5

## 2019-04-16 ASSESSMENT — ENCOUNTER SYMPTOMS
CHEST TIGHTNESS: 0
PHOTOPHOBIA: 0
SINUS PRESSURE: 0
STRIDOR: 0
SHORTNESS OF BREATH: 0
EYE PAIN: 0

## 2019-04-16 NOTE — LETTER
[facility] Sleep Medicine  [facility] [address] [facility] [address]  Phone: [phone]  Fax: [phone]    April 16, 2019       Patient: Israel Walton   MR Number: [id number]   YOB: 1970   Date of Visit: 4/16/2019       Zoey Avalos was seen for a follow up visit today. Here is my assessment and plan as well as an attached copy of his visit today:    Obstructive apnea  Chronic - untreated:  Continue medications per his PCP and other physicians. He instructed not to drive unless had 4 hrs of effective therapy for his MARIA M the night before. Did review the risks of under or untreated MARIA M including, but not limited to, higher risks of motor vehicle accidents, stroke, heart attacks, and death. He understands and accepts all these risks. Pt has not followed up with [facility] for eval for inspire and has been encouraged to do it sooner than latter. Atrial fibrillation with RVR (HCC)  Chronic- Stable. Cont meds per PCP and other physicians. Insomnia  Chronic- uncontrolled. Patient has failed several meds or meds will interact with is retro-virals    RLS (restless legs syndrome)  Chronic- worsening. Cont neurontin 600 mg total for now till follows up with neuro. If you have questions or concerns, please do not hesitate to call me. I look forward to following John along with you.     Sincerely,    Ly Castillo MD    CC providers:  Bekah Davis, [address] [facility] [address]  VIA Mail

## 2019-04-16 NOTE — PROGRESS NOTES
Shameka Reid MD, FAA, [facility]  Perla Man  [facility]  3rd Floor, [address], [address] [phone]   [address] [place] [address] [address] [place] [address] Daniel Cabrera. Jayme Del Toro [address] [phone]       [address] [facility]  [address] [facility] [address] [phone]    Assessment/Plan:     Obstructive apnea  Chronic - untreated:  Continue medications per his PCP and other physicians. He instructed not to drive unless had 4 hrs of effective therapy for his MARIA M the night before. Did review the risks of under or untreated MARIA M including, but not limited to, higher risks of motor vehicle accidents, stroke, heart attacks, and death. He understands and accepts all these risks. Pt has not followed up with [facility] for eval for inspire and has been encouraged to do it sooner than latter. Atrial fibrillation with RVR ([facility])  Chronic- Stable. Cont meds per PCP and other physicians. Insomnia  Chronic- uncontrolled. Patient has failed several meds or meds will interact with is retro-virals    RLS (restless legs syndrome)  Chronic- worsening. Cont neurontin 600 mg total for now till follows up with neuro. Diagnoses of Obstructive apnea, Atrial fibrillation with RVR ([address] Psychophysiological insomnia, and RLS (restless legs syndrome) were pertinent to this visit. The chronic medical conditions listed are directly related to the primary diagnosis listed above. The management of the primary diagnosis affects the secondary diagnosis and vice versa. Subjective:     Patient ID: Satnam Lockett is a 46 y.o. male. Chief Complaint   Patient presents with    Sleep Apnea     Subjective   HPI:    MARIA M: Still has not followed up with [facility] for possible Inspire. Symptoms have not changed and not able to use CPAP. Insomnia:  Still struggling with sleep. Having pain from neuropathy and knee pain. States was released from pain management. Discussed insomnia as a side-effect of some of his retro-virals. RLS:  Still taking gapatentin 300-600 mg at night but feels it is not working as well as previously. Has not followed up with neuro for some time. Atrial fibrillation : stable on meds and followed by pt's PCP and other physicians.     [place] - Total score: 5    Social History     Socioeconomic History    Marital status:      Spouse name: Not on file    Number of children: 3    Years of education: Not on file    Highest education level: Not on file   Occupational History    Occupation: retired   Social Needs    Financial resource strain: Not on file    Food insecurity:     Worry: Not on file     Inability: Not on file   [employer] needs:     Medical: Not on file     Non-medical: Not on file   Tobacco Use    Smoking status: Never Smoker    Smokeless tobacco: Never Used   Substance and Sexual Activity    Alcohol use: Yes     Comment: once a month    Drug use: No    Sexual activity: Not on file   Lifestyle    Physical activity:     Days per week: Not on file     Minutes per session: Not on file    Stress: Not on file   Relationships    Social connections:     Talks on phone: Not on file     Gets together: Not on file     Attends Holiness service: Not on file     Active member of club or organization: Not on file     Attends meetings of clubs or organizations: Not on file     Relationship status: Not on file    Intimate partner violence:     Fear of current or ex partner: Not on file     Emotionally abused: Not on file     Physically abused: Not on file     Forced sexual activity: Not on file   Other Topics Concern    Not on file   Social History Narrative    Not on file       Current Outpatient Medications   Medication Sig Dispense Refill    elvitegravir-cobicistat-emtricitabine-tenofovir (STRIBILD) [id number] MG tablet Take 1 tablet by mouth      gabapentin (NEURONTIN) 300 MG capsule 2-3 po qHS 90 capsule 5    pantoprazole (PROTONIX) 20 MG tablet TAKE ONE TABLET BY MOUTH DAILY 30 tablet 0    sulfamethoxazole-trimethoprim (BACTRIM;SEPTRA) 400-80 MG per tablet TAKE ONE TABLET BY MOUTH DAILY 30 tablet 9    Nutritional Supplements (THERALITH XR PO) Take 1 tablet by mouth as needed        No current facility-administered medications for this visit.         Allergies as of 04/16/2019 - Review Complete 04/16/2019   Allergen Reaction Noted    Cephalosporins Hives 09/10/2018    Requip [ropinirole hcl] Other (See Comments) 09/11/2017    Penicillins Hives and Rash 09/23/2013       Patient Active Problem List   Diagnosis    Human immunodeficiency virus (HIV) disease ([facility]    Depression    Insomnia    Nephrolithiasis    Condyloma acuminata    Fatigue    Neutropenia with fever (HCC)    Sepsis ([address]    Penicillin allergy    Syphilis    Atrial fibrillation with RVR (HCC)    Distal radius fracture    Obstructive apnea    Influenza    Pneumonia    SIRS (systemic inflammatory response syndrome) (HCC)    HIV (human immunodeficiency virus infection) (HCC)    Elevated LFTs    Pancytopenia (HCC)    AIN (anal intraepithelial neoplasia) anal canal    RLS (restless legs syndrome)       Past Medical History:   Diagnosis Date    A-fib ([facility] JULY 4 2014    POSSIBLE REACTION TO A MEDICATION    Depression     Hepatitis C     HIV (human immunodeficiency virus infection) ([facility]     Hx of blood clots     x1    Influenza A 3/16/16    Kidney stones     Numbness and tingling in left arm     Pulmonary embolism ([facility] 2007    Left lung    Sleep apnea     under going studies not definite    Syphilis, secondary     Thoracic outlet syndrome        Past Surgical History:   Procedure Laterality Date    ELBOW SURGERY Right     ULNAR NERVE    FIRST RIB REMOVAL      and extra rib removed    KNEE ARTHROSCOPY Left     OTHER SURGICAL HISTORY  06/03/2014    EXAMINATION UNDER ANESTHESIA AND FULGURATION OF ANAL    OTHER SURGICAL HISTORY  10/7/14    EXAMINATION UNDER ANESTHESIA, ANAL BIOPSY    OTHER SURGICAL HISTORY N/A 10/29/2015    RECTAL EXAMINATION UNDER ANESTHESIA AND RANDOM BIOPSIES    OTHER SURGICAL HISTORY  02/21/2017    EXAMNINATION UNDER ANESTHESIA , ANAL BIOPSIES, ANAL ABLATION    [place] REPAIR ROTATOR CUFF,ACUTE Bilateral     Rotator Cuff Repair    RECTAL SURGERY  01/27/2015    RECTAL EXAM UNDER ANESTHESIA AND BIOPSY    SHOULDER SURGERY      bilateral for torn rotator cuffs    SINUS SURGERY      WISDOM TOOTH EXTRACTION      WRIST FRACTURE SURGERY Left 10/28/2014       Family History   Problem Relation Age of Onset    Stroke Father     Prostate Cancer Father     Heart Disease Brother            ROS:  Review of Systems   Constitutional: Positive for fatigue. HENT: Negative for dental problem, ear pain, nosebleeds, sinus pressure and sneezing. Eyes: Negative for photophobia, pain and visual disturbance. Respiratory: Negative for chest tightness, shortness of breath and stridor. Cardiovascular: Negative for chest pain, palpitations and leg swelling. Musculoskeletal: Positive for arthralgias. Negative for neck pain and neck stiffness. Psychiatric/Behavioral: Positive for sleep disturbance. Vitals:  Weight BMI   Wt Readings from Last 3 Encounters:   04/16/19 203 lb (92.1 kg)   10/16/18 198 lb (89.8 kg)   09/10/18 204 lb (92.5 kg)    Body mass index is 26.06 kg/m².      BP HR SaO2   BP Readings from Last 3 Encounters:   04/16/19 138/80   10/16/18 131/88   09/10/18 130/68    Pulse Readings from Last 3 Encounters:   04/16/19 95   10/16/18 107   04/17/18 92    SpO2 Readings from Last 3 Encounters:   04/16/19 98%   10/16/18 96%   04/17/18 98%          Electronically signed by Connie Mullins MD on 4/16/2019 at 3:16 PM

## 2019-04-16 NOTE — ASSESSMENT & PLAN NOTE
Chronic - untreated:  Continue medications per his PCP and other physicians. He instructed not to drive unless had 4 hrs of effective therapy for his MARIA M the night before. Did review the risks of under or untreated MARIA M including, but not limited to, higher risks of motor vehicle accidents, stroke, heart attacks, and death. He understands and accepts all these risks. Pt has not followed up with [facility] for eval for inspire and has been encouraged to do it sooner than latter.

## 2020-01-13 ENCOUNTER — OFFICE VISIT (OUTPATIENT)
Dept: SURGERY | Age: 50
End: 2020-01-13
Payer: COMMERCIAL

## 2020-01-13 VITALS
SYSTOLIC BLOOD PRESSURE: 163 MMHG | BODY MASS INDEX: 27.85 KG/M2 | RESPIRATION RATE: 20 BRPM | WEIGHT: 217 LBS | DIASTOLIC BLOOD PRESSURE: 89 MMHG | HEIGHT: 74 IN

## 2020-01-13 PROCEDURE — 99213 OFFICE O/P EST LOW 20 MIN: CPT | Performed by: SURGERY

## 2020-01-13 NOTE — PROGRESS NOTES
OTHER SURGICAL HISTORY N/A 10/29/2015    RECTAL EXAMINATION UNDER ANESTHESIA AND RANDOM BIOPSIES    OTHER SURGICAL HISTORY  02/21/2017    EXAMNINATION UNDER ANESTHESIA , ANAL BIOPSIES, ANAL ABLATION    [place] REPAIR ROTATOR CUFF,ACUTE Bilateral     Rotator Cuff Repair    RECTAL SURGERY  01/27/2015    RECTAL EXAM UNDER ANESTHESIA AND BIOPSY    SHOULDER SURGERY      bilateral for torn rotator cuffs    SINUS SURGERY      WISDOM TOOTH EXTRACTION      WRIST FRACTURE SURGERY Left 10/28/2014      Not in a hospital admission. Allergies   Allergen Reactions    Cephalosporins Hives    Requip [Ropinirole Hcl] Other (See Comments)     Possible irregular heart rythm    Penicillins Hives and Rash     fever      Social History     Tobacco Use    Smoking status: Never Smoker    Smokeless tobacco: Never Used   Substance Use Topics    Alcohol use: Yes     Comment: once a month      Family History   Problem Relation Age of Onset    Stroke Father     Prostate Cancer Father     Heart Disease Brother       Review of Systems    GEN: reviewed and negative except as noted in HPI. GI: reviewed and negative except as noted in HPI. No bleeding, constipation or diarrhea. A 14 point complete review of systems was conducted and was negative except as noted in HPI       Objective:     GEN: appears well, no distress, appears stated age  PSYCH: normal mood, normal affect  NECK: no neck masses, trachea midline  ENT: moist oral mucosa; anicteric  SKIN: no rash or jaundice  CV: regular heart rate and rhythm  PULM: normal respiratory effort, no wheezing  GI: soft non tender abdomen. Normal bowel sounds  RECTAL: No external lesions. On internal exam there are some whitish discolored plaques. EXT/NEURO: normal gait, strength/sensation grossly intact in all extremities      Assessment:     Anal dysplasia, HIV     Plan:     RBA of EUA, biopsies, destruction of dysplasia discussed.  Patient agrees to proceed    Will schedule     Shahnaz Wilson

## 2020-01-16 NOTE — PROGRESS NOTES
[facility] [employer] / [facility] ([facility]) [address], [address]    Acknowledgment of Informed Consent for Surgical or Medical Procedure and Sedation  I agree to allow doctor(s) Miles Winter and his/her associates or assistants, including residents and/or other qualified medical practitioner to perform the following medical treatment or procedure and to administer or direct the administration of sedation as necessary:  Procedure(s): EXAMINATION UNDER ANESTHESIA, ANAL BIOPSIES, DESTRUCTION OF DYSPLASIA  My doctor has explained the following regarding the proposed procedure:   the explanation of the procedure   the benefits of the procedure   the potential problems that might occur during recuperation   the risks and side effects of the procedure which could include but are not limited to severe blood loss, infection, stroke or death   the benefits, risks and side effect of alternative procedures including the consequences of declining this procedure or any alternative procedures   the likelihood of achieving satisfactory results. I acknowledge no guarantee or assurance has been made to me regarding the results. I understand that during the course of this treatment/procedure, unforeseen conditions can occur which require an additional or different procedure. I agree to allow my physician or assistants to perform such extension of the original procedure as they may find necessary. I understand that sedation will often result in temporary impairment of memory and fine motor skills and that sedation can occasionally progress to a state of deep sedation or general anesthesia. I understand the risks of anesthesia for surgery include, but are not limited to, sore throat, hoarseness, injury to face, mouth, or teeth; nausea; headache; injury to blood vessels or nerves; death, brain damage, or paralysis.     I understand that if I have a Limitation of Treatment order in effect during my

## 2020-01-20 NOTE — PROGRESS NOTES
Left message on voice mail to obtain pre op lab work as ordered by Dr. Yan Sibley during pre op H&P.

## 2020-01-20 NOTE — PROGRESS NOTES
PRE-OP INSTRUCTIONS FOR THE SURGICAL PATIENT YOU ARE UNABLE TO MAKE CONTACT FOR AN INTERVIEW: Called X 2      1. Follow instructions for your ARRIVAL TIME as DIRECTED BY YOUR SURGEON. 2. Enter the MAIN entrance located on [address] and report to the desk. 3. Bring your insurance & prescription card and photo ID with you. You may also be asked to pay a co-pay, as you may want to bring a check or credit card with you. 4. Leave all other valuables at home. 5. Arrange for someone to drive you home and be with you for the first 24 hours after discharge. 6. Bring your medication list with you day of surgery with doses and frequency listed  7. You must contact your surgeon for Instructions regarding:         - ALL medication instructions, especially if taking blood thinners, aspirin, or diabetic medication.  - IF  there is a change in your physical condition such as a cold, fever, rash, cuts, sores or any other infection, especially near your surgical site. 8. A Pre-op History and Physical for surgery MUST be completed by your Physician or an Urgent Care within 30 days of your procedure date. Please bring a copy with you on the day of your procedure and along with any other testing performed. 9. DO NOT EAT ANYTHING eight hours prior to surgery. May have up to 8 ounces of water 4 hours prior to surgery. 10. No gum, candy, mints, or ice chips day of procedure. 11. Please refrain from drinking alcohol the day before or day of your procedure. 12. Please do not smoke the day of your procedure. 13. Dress in loose, comfortable clothing appropriate for redressing after your procedure. Do not wear jewelry (including body piercings), make-up, fingernail polish, lotion, powders or metal hairclips. 15. Contacts will need to be removed prior to surgery. You may want to bring your            Eye glasses to wear immediately before and after surgery.   14. Dentures will need to be removed

## 2020-01-22 ENCOUNTER — ANESTHESIA EVENT (OUTPATIENT)
Dept: OPERATING ROOM | Age: 50
End: 2020-01-22
Payer: COMMERCIAL

## 2020-01-23 ENCOUNTER — TELEPHONE (OUTPATIENT)
Dept: SURGERY | Age: 50
End: 2020-01-23

## 2020-01-23 ENCOUNTER — HOSPITAL ENCOUNTER (OUTPATIENT)
Age: 50
Setting detail: OUTPATIENT SURGERY
Discharge: HOME | End: 2020-01-23
Attending: SURGERY | Admitting: SURGERY
Payer: COMMERCIAL

## 2020-01-23 ENCOUNTER — ANESTHESIA (OUTPATIENT)
Dept: OPERATING ROOM | Age: 50
End: 2020-01-23
Payer: COMMERCIAL

## 2020-01-23 VITALS
SYSTOLIC BLOOD PRESSURE: 88 MMHG | DIASTOLIC BLOOD PRESSURE: 46 MMHG | TEMPERATURE: 97.3 F | OXYGEN SATURATION: 95 % | RESPIRATION RATE: 6 BRPM

## 2020-01-23 VITALS
SYSTOLIC BLOOD PRESSURE: 153 MMHG | WEIGHT: 210 LBS | RESPIRATION RATE: 13 BRPM | BODY MASS INDEX: 26.95 KG/M2 | TEMPERATURE: 97.3 F | DIASTOLIC BLOOD PRESSURE: 89 MMHG | HEART RATE: 86 BPM | OXYGEN SATURATION: 92 % | HEIGHT: 74 IN

## 2020-01-23 LAB
INR BLD: 1.35 (ref 0.86–1.14)
PROTHROMBIN TIME: 15.7 SEC (ref 10–13.2)

## 2020-01-23 PROCEDURE — 2580000003 HC RX 258: Performed by: ANESTHESIOLOGY

## 2020-01-23 PROCEDURE — 6370000000 HC RX 637 (ALT 250 FOR IP): Performed by: SURGERY

## 2020-01-23 PROCEDURE — 85610 PROTHROMBIN TIME: CPT

## 2020-01-23 PROCEDURE — 2500000003 HC RX 250 WO HCPCS: Performed by: SURGERY

## 2020-01-23 PROCEDURE — C9290 INJ, BUPIVACAINE LIPOSOME: HCPCS | Performed by: SURGERY

## 2020-01-23 PROCEDURE — 46910 DESTRUCTION ANAL LESION(S): CPT | Performed by: SURGERY

## 2020-01-23 PROCEDURE — 2580000003 HC RX 258: Performed by: NURSE ANESTHETIST, CERTIFIED REGISTERED

## 2020-01-23 PROCEDURE — 6360000002 HC RX W HCPCS: Performed by: SURGERY

## 2020-01-23 PROCEDURE — 2500000003 HC RX 250 WO HCPCS: Performed by: NURSE ANESTHETIST, CERTIFIED REGISTERED

## 2020-01-23 PROCEDURE — 7100000001 HC PACU RECOVERY - ADDTL 15 MIN: Performed by: SURGERY

## 2020-01-23 PROCEDURE — 6360000002 HC RX W HCPCS: Performed by: NURSE ANESTHETIST, CERTIFIED REGISTERED

## 2020-01-23 PROCEDURE — 2709999900 HC NON-CHARGEABLE SUPPLY: Performed by: SURGERY

## 2020-01-23 PROCEDURE — 3700000001 HC ADD 15 MINUTES (ANESTHESIA): Performed by: SURGERY

## 2020-01-23 PROCEDURE — 2580000003 HC RX 258: Performed by: SURGERY

## 2020-01-23 PROCEDURE — 7100000000 HC PACU RECOVERY - FIRST 15 MIN: Performed by: SURGERY

## 2020-01-23 PROCEDURE — 3600000003 HC SURGERY LEVEL 3 BASE: Performed by: SURGERY

## 2020-01-23 PROCEDURE — 3600000013 HC SURGERY LEVEL 3 ADDTL 15MIN: Performed by: SURGERY

## 2020-01-23 PROCEDURE — 46922 EXCISION OF ANAL LESION(S): CPT | Performed by: SURGERY

## 2020-01-23 PROCEDURE — 88305 TISSUE EXAM BY PATHOLOGIST: CPT

## 2020-01-23 PROCEDURE — 3700000000 HC ANESTHESIA ATTENDED CARE: Performed by: SURGERY

## 2020-01-23 RX ORDER — ONDANSETRON 2 MG/ML
4 INJECTION INTRAMUSCULAR; INTRAVENOUS
Status: DISCONTINUED | OUTPATIENT
Start: 2020-01-23 | End: 2020-01-23 | Stop reason: HOSPADM

## 2020-01-23 RX ORDER — CLINDAMYCIN PHOSPHATE 900 MG/50ML
900 INJECTION INTRAVENOUS ONCE
Status: COMPLETED | OUTPATIENT
Start: 2020-01-23 | End: 2020-01-23

## 2020-01-23 RX ORDER — DEXAMETHASONE SODIUM PHOSPHATE 4 MG/ML
INJECTION, SOLUTION INTRA-ARTICULAR; INTRALESIONAL; INTRAMUSCULAR; INTRAVENOUS; SOFT TISSUE PRN
Status: DISCONTINUED | OUTPATIENT
Start: 2020-01-23 | End: 2020-01-23 | Stop reason: SDUPTHER

## 2020-01-23 RX ORDER — MAGNESIUM HYDROXIDE 1200 MG/15ML
LIQUID ORAL CONTINUOUS PRN
Status: COMPLETED | OUTPATIENT
Start: 2020-01-23 | End: 2020-01-23

## 2020-01-23 RX ORDER — MIDAZOLAM HYDROCHLORIDE 1 MG/ML
INJECTION INTRAMUSCULAR; INTRAVENOUS PRN
Status: DISCONTINUED | OUTPATIENT
Start: 2020-01-23 | End: 2020-01-23 | Stop reason: SDUPTHER

## 2020-01-23 RX ORDER — SODIUM CHLORIDE, SODIUM LACTATE, POTASSIUM CHLORIDE, CALCIUM CHLORIDE 600; 310; 30; 20 MG/100ML; MG/100ML; MG/100ML; MG/100ML
INJECTION, SOLUTION INTRAVENOUS CONTINUOUS PRN
Status: DISCONTINUED | OUTPATIENT
Start: 2020-01-23 | End: 2020-01-23 | Stop reason: SDUPTHER

## 2020-01-23 RX ORDER — OXYCODONE HYDROCHLORIDE AND ACETAMINOPHEN 5; 325 MG/1; MG/1
1-2 TABLET ORAL EVERY 6 HOURS PRN
Qty: 42 TABLET | Refills: 0 | Status: SHIPPED | OUTPATIENT
Start: 2020-01-23 | End: 2020-01-30

## 2020-01-23 RX ORDER — DOCUSATE SODIUM 100 MG/1
100 CAPSULE, LIQUID FILLED ORAL 2 TIMES DAILY
Qty: 60 CAPSULE | Refills: 0 | Status: SHIPPED | OUTPATIENT
Start: 2020-01-23 | End: 2020-02-22

## 2020-01-23 RX ORDER — FENTANYL CITRATE 50 UG/ML
INJECTION, SOLUTION INTRAMUSCULAR; INTRAVENOUS PRN
Status: DISCONTINUED | OUTPATIENT
Start: 2020-01-23 | End: 2020-01-23 | Stop reason: SDUPTHER

## 2020-01-23 RX ORDER — PROPOFOL 10 MG/ML
INJECTION, EMULSION INTRAVENOUS PRN
Status: DISCONTINUED | OUTPATIENT
Start: 2020-01-23 | End: 2020-01-23 | Stop reason: SDUPTHER

## 2020-01-23 RX ORDER — FENTANYL CITRATE 50 UG/ML
25 INJECTION, SOLUTION INTRAMUSCULAR; INTRAVENOUS EVERY 5 MIN PRN
Status: DISCONTINUED | OUTPATIENT
Start: 2020-01-23 | End: 2020-01-23 | Stop reason: HOSPADM

## 2020-01-23 RX ORDER — ONDANSETRON 2 MG/ML
INJECTION INTRAMUSCULAR; INTRAVENOUS PRN
Status: DISCONTINUED | OUTPATIENT
Start: 2020-01-23 | End: 2020-01-23 | Stop reason: SDUPTHER

## 2020-01-23 RX ORDER — BUPIVACAINE HYDROCHLORIDE AND EPINEPHRINE 5; 5 MG/ML; UG/ML
INJECTION, SOLUTION EPIDURAL; INTRACAUDAL; PERINEURAL PRN
Status: DISCONTINUED | OUTPATIENT
Start: 2020-01-23 | End: 2020-01-23 | Stop reason: ALTCHOICE

## 2020-01-23 RX ORDER — SODIUM CHLORIDE, SODIUM LACTATE, POTASSIUM CHLORIDE, CALCIUM CHLORIDE 600; 310; 30; 20 MG/100ML; MG/100ML; MG/100ML; MG/100ML
INJECTION, SOLUTION INTRAVENOUS CONTINUOUS
Status: DISCONTINUED | OUTPATIENT
Start: 2020-01-23 | End: 2020-01-23 | Stop reason: HOSPADM

## 2020-01-23 RX ORDER — LIDOCAINE HYDROCHLORIDE 20 MG/ML
INJECTION, SOLUTION INFILTRATION; PERINEURAL PRN
Status: DISCONTINUED | OUTPATIENT
Start: 2020-01-23 | End: 2020-01-23 | Stop reason: SDUPTHER

## 2020-01-23 RX ADMIN — SODIUM CHLORIDE, POTASSIUM CHLORIDE, SODIUM LACTATE AND CALCIUM CHLORIDE: 600; 310; 30; 20 INJECTION, SOLUTION INTRAVENOUS at 10:36

## 2020-01-23 RX ADMIN — PROPOFOL 200 MG: 10 INJECTION, EMULSION INTRAVENOUS at 09:01

## 2020-01-23 RX ADMIN — DEXAMETHASONE SODIUM PHOSPHATE 4 MG: 4 INJECTION, SOLUTION INTRAMUSCULAR; INTRAVENOUS at 09:33

## 2020-01-23 RX ADMIN — METRONIDAZOLE 500 MG: 500 INJECTION, SOLUTION INTRAVENOUS at 09:12

## 2020-01-23 RX ADMIN — PROPOFOL 50 MG: 10 INJECTION, EMULSION INTRAVENOUS at 09:06

## 2020-01-23 RX ADMIN — ONDANSETRON 4 MG: 2 INJECTION INTRAMUSCULAR; INTRAVENOUS at 09:33

## 2020-01-23 RX ADMIN — MIDAZOLAM 2 MG: 1 INJECTION INTRAMUSCULAR; INTRAVENOUS at 08:46

## 2020-01-23 RX ADMIN — SODIUM CHLORIDE, POTASSIUM CHLORIDE, SODIUM LACTATE AND CALCIUM CHLORIDE: 600; 310; 30; 20 INJECTION, SOLUTION INTRAVENOUS at 07:40

## 2020-01-23 RX ADMIN — SODIUM CHLORIDE, SODIUM LACTATE, POTASSIUM CHLORIDE, AND CALCIUM CHLORIDE: 600; 310; 30; 20 INJECTION, SOLUTION INTRAVENOUS at 08:44

## 2020-01-23 RX ADMIN — CLINDAMYCIN PHOSPHATE 900 MG: 18 INJECTION, SOLUTION INTRAVENOUS at 09:05

## 2020-01-23 RX ADMIN — PROPOFOL 50 MG: 10 INJECTION, EMULSION INTRAVENOUS at 09:02

## 2020-01-23 RX ADMIN — PROPOFOL 50 MG: 10 INJECTION, EMULSION INTRAVENOUS at 09:15

## 2020-01-23 RX ADMIN — FENTANYL CITRATE 100 MCG: 50 INJECTION INTRAMUSCULAR; INTRAVENOUS at 09:17

## 2020-01-23 RX ADMIN — LIDOCAINE HYDROCHLORIDE 100 MG: 20 INJECTION, SOLUTION INFILTRATION; PERINEURAL at 09:01

## 2020-01-23 RX ADMIN — ENOXAPARIN SODIUM 30 MG: 30 INJECTION SUBCUTANEOUS at 07:56

## 2020-01-23 ASSESSMENT — PULMONARY FUNCTION TESTS
PIF_VALUE: 15
PIF_VALUE: 2
PIF_VALUE: 1
PIF_VALUE: 1
PIF_VALUE: 15
PIF_VALUE: 0
PIF_VALUE: 24
PIF_VALUE: 1
PIF_VALUE: 21
PIF_VALUE: 2
PIF_VALUE: 20
PIF_VALUE: 24
PIF_VALUE: 15
PIF_VALUE: 0
PIF_VALUE: 15
PIF_VALUE: 1
PIF_VALUE: 22
PIF_VALUE: 10
PIF_VALUE: 6
PIF_VALUE: 15
PIF_VALUE: 1
PIF_VALUE: 5
PIF_VALUE: 22
PIF_VALUE: 23
PIF_VALUE: 2
PIF_VALUE: 21
PIF_VALUE: 5
PIF_VALUE: 5
PIF_VALUE: 14
PIF_VALUE: 15
PIF_VALUE: 2
PIF_VALUE: 3
PIF_VALUE: 24
PIF_VALUE: 1
PIF_VALUE: 18
PIF_VALUE: 25
PIF_VALUE: 5
PIF_VALUE: 1
PIF_VALUE: 6
PIF_VALUE: 4
PIF_VALUE: 4
PIF_VALUE: 6
PIF_VALUE: 0
PIF_VALUE: 2
PIF_VALUE: 3
PIF_VALUE: 1
PIF_VALUE: 15

## 2020-01-23 ASSESSMENT — PAIN - FUNCTIONAL ASSESSMENT: PAIN_FUNCTIONAL_ASSESSMENT: 0-10

## 2020-01-23 ASSESSMENT — PAIN SCALES - GENERAL: PAINLEVEL_OUTOF10: 0

## 2020-01-23 NOTE — PROGRESS NOTES
Wife, Laura, updated on pt and POC, offered a visit, but stated she would rather be updated on the phone.

## 2020-01-23 NOTE — H&P
Oral Space    [id number]    [facility] Same Day Surgery Update H & P  Department of General Surgery   Surgical Service   Pre-operative History and Physical  Last H & P within the last 30 days. DIAGNOSIS:   Anal dysplasia [K62.82]    PROCEDURE:  DE DESTRUCTION,ANAL LESION(S),EXTENSIVE [95658] (EXAMINATION UNDER ANESTHESIA, ANAL BIOPSIES, DESTRUCTION OF DYSPLASIA)      HISTORY OF PRESENT ILLNESS:   Patient with HIV and anal dysplasia  presents today for the above procedure.       Past Medical History:        Diagnosis Date    A-fib [facility]) JULY 4 2014    POSSIBLE REACTION TO A MEDICATION    Depression     Hepatitis C     HIV (human immunodeficiency virus infection) [facility]     Hx of blood clots     x 3    Influenza A 3/16/16    Kidney stones     Numbness and tingling in left arm     Pulmonary embolism ([facility] [address] [facility] 2007    Left lung    Sleep apnea     under going studies not definite    Syphilis, secondary     Thoracic outlet syndrome      Past Surgical History:        Procedure Laterality Date    ELBOW SURGERY Right     ULNAR NERVE    FIRST RIB REMOVAL      and extra rib removed    KNEE ARTHROSCOPY Left     OTHER SURGICAL HISTORY  06/03/2014    EXAMINATION UNDER ANESTHESIA AND FULGURATION OF ANAL    OTHER SURGICAL HISTORY  10/7/14    EXAMINATION UNDER ANESTHESIA, ANAL BIOPSY    OTHER SURGICAL HISTORY N/A 10/29/2015    RECTAL EXAMINATION UNDER ANESTHESIA AND RANDOM BIOPSIES    OTHER SURGICAL HISTORY  02/21/2017    EXAMNINATION UNDER ANESTHESIA , ANAL BIOPSIES, ANAL ABLATION    DE REPAIR ROTATOR CUFF,ACUTE Bilateral     Rotator Cuff Repair    RECTAL SURGERY  01/27/2015    RECTAL EXAM UNDER ANESTHESIA AND BIOPSY    SHOULDER SURGERY      bilateral for torn rotator cuffs    SINUS SURGERY      WISDOM TOOTH EXTRACTION      WRIST FRACTURE SURGERY Left 10/28/2014     Past Social History:  Social History     Socioeconomic History    Marital status:      Spouse name: None    Number of children: 3    Years of education: None    Highest education level: None   Occupational History    Occupation: retired   Social Needs    Financial resource strain: None    Food insecurity:     Worry: None     Inability: None    Transportation needs:     Medical: None     Non-medical: None   Tobacco Use    Smoking status: Never Smoker    Smokeless tobacco: Never Used   Substance and Sexual Activity    Alcohol use: Not Currently     Comment: once a month    Drug use: No    Sexual activity: None   Lifestyle    Physical activity:     Days per week: None     Minutes per session: None    Stress: None   Relationships    Social connections:     Talks on phone: None     Gets together: None     Attends [employer] service: None     Active member of club or organization: None     Attends meetings of clubs or organizations: None     Relationship status: None    Intimate partner violence:     Fear of current or ex partner: None     Emotionally abused: None     Physically abused: None     Forced sexual activity: None   Other Topics Concern    None   Social History Narrative    None         Medications Prior to Admission:      Prior to Admission medications    Medication Sig Start Date End Date Taking?  Authorizing Provider   [id number]-tenofAF [facility]) [phone] MG TABS Take 1 tablet by mouth daily 9/13/19  Yes Historical Provider, MD   dolutegravir sodium (TIVICAY) 50 MG tablet Take 50 mg by mouth 2 times daily   Yes Historical Provider, MD   Doravirine 100 MG TABS Take 100 mg by mouth daily   Yes Historical Provider, MD   gabapentin (NEURONTIN) 300 MG capsule 2-3 po qHS 6/26/17  Yes Hitesh Do MD   pantoprazole (PROTONIX) 20 MG tablet TAKE ONE TABLET BY MOUTH DAILY 3/9/17  Yes Brandon Juan MD   sulfamethoxazole-trimethoprim (BACTRIM;SEPTRA) 400-80 MG per tablet TAKE ONE TABLET BY MOUTH DAILY 3/14/16   Brandon Juan MD   Nutritional Supplements (THERALITH XR PO) Take 1 tablet by mouth as needed     Historical Provider, MD         Allergies:  Cephalosporins; Quetiapine fumarate; Requip [ropinirole hcl]; and Penicillins    PHYSICAL EXAM:      BP (!) 166/101   Pulse 95   Temp 98.1 °F (36.7 °C) (Oral)   Resp 18   Ht 6' 2\" (1.88 m)   Wt 210 lb (95.3 kg)   SpO2 91%   BMI 26.96 kg/m²      Heart:  regular rate and rhythm, No murmur noted    Lungs:  No increased work of breathing, good air exchange, clear to auscultation bilaterally, no crackles or wheezing    Abdomen:  soft, non-distended, non-tender, no rebound tenderness or guarding, normal active bowel sounds and no masses palpated    ASSESSMENT AND PLAN:    1. Patient seen and focused exam done today- no new changes since last physical exam on 1/17/20    2. Access to ancillary services are available per request of the provider.     FLOYD Euceda - CNP     1/23/2020

## 2020-01-23 NOTE — PROGRESS NOTES
PACU Discharge Note    Current Allergies: Cephalosporins; Quetiapine fumarate; Requip [ropinirole hcl]; and Penicillins    Pt meets criteria for discharge to home per Remi Score and ASPAN standards. Discharge instructions reviewed with patient and family. Both verbalized understanding of instructions. Gave patient and family opportunity to ask questions. All questions reviewed and answered. Documents signed and copy of discharge instructions given. Vitals:    01/23/20 1148   BP: (!) 153/89   Pulse: 86   Resp: 13   Temp: 97.3 °F (36.3 °C)   SpO2: 92%      BP within 20% of pt's admitting BP per REMI SCORE      Intake/Output Summary (Last 24 hours) at 1/23/2020 1210  Last data filed at 1/23/2020 1209  Gross per 24 hour   Intake 1139 ml   Output 225 ml   Net 914 ml         Pain assessment:  none  Pain Level: 0    Offered patient opportunity to use restroom prior to discharge. Patient voided 225 mL in urinal, walked back to bed. Patient discharged to home/self care.  Patient discharged via wheel chair by transporter/RN to waiting family/S.O.       1/23/2020 12:10 PM

## 2020-01-23 NOTE — TELEPHONE ENCOUNTER
The patient had surgery with Dr. Ariadna Bianchi today. The patient was given an rx for oxyCODONE-acetaminophen (PERCOCET) 5-325 MG per tablet #42. The patients insurance is requesting a PA because the rx is written for more then a 7 day supply of pain medication. Please call Karol Strickland [phone] or change the directions and quantity. Please call with questions. Pharmacy info in chart.

## 2020-01-23 NOTE — PROGRESS NOTES
Patient admitted to PACU # 12 from OR at 4180 post EXAMINATION UNDER ANESTHESIA, ANAL BIOPSIES, DESTRUCTION OF CHONDYLOMA   per Dr. Lisset Espinosa. Attached to PACU monitoring system and report received from anesthesia provider. CRNA inserted oral airway at bedside, d/t bradypneic and obstructing own airway. Patient was reported to be hemodynamically stable during procedure. Pt unresponsive currently, not showing any s/s of pain.

## 2020-01-23 NOTE — ANESTHESIA PRE PROCEDURE
Department of Anesthesiology  Preprocedure Note       Name:  Sd Valentino   Age:  44 y.o.  :  1970                                          MRN:  [id number]         Date:  2020      Surgeon: Ivy Bustillo):  Ike Meadows MD    Procedure: EXAMINATION UNDER ANESTHESIA, ANAL BIOPSIES, DESTRUCTION OF DYSPLASIA (N/A )    Medications prior to admission:   Prior to Admission medications    Medication Sig Start Date End Date Taking? Authorizing Provider   [id number]-ihiaf-puzpgyjv-tenofAF [facility]) [phone] MG TABS Take 1 tablet by mouth daily 19  Yes Historical Provider, MD   dolutegravir sodium (TIVICAY) 50 MG tablet Take 50 mg by mouth 2 times daily   Yes Historical Provider, MD   Doravirine 100 MG TABS Take 100 mg by mouth daily   Yes Historical Provider, MD   gabapentin (NEURONTIN) 300 MG capsule 2-3 po qHS 17  Yes Mendel Fragoso MD   pantoprazole (PROTONIX) 20 MG tablet TAKE ONE TABLET BY MOUTH DAILY 3/9/17  Yes Jose Antonio Boland MD   sulfamethoxazole-trimethoprim (BACTRIM;SEPTRA) 400-80 MG per tablet TAKE ONE TABLET BY MOUTH DAILY 3/14/16   Jose Antonio Boland MD   Nutritional Supplements (THERALITH XR PO) Take 1 tablet by mouth as needed     Historical Provider, MD       Current medications:    Current Facility-Administered Medications   Medication Dose Route Frequency Provider Last Rate Last Dose    clindamycin (CLEOCIN) 900 mg in dextrose 5 % 50 mL IVPB  900 mg Intravenous Once Ike Meadows MD        metronidazole (FLAGYL) 500 mg in NaCl 100 mL IVPB premix  500 mg Intravenous Once Ike Meadows MD        lactated ringers infusion   Intravenous Continuous Angela Blake  mL/hr at 20 0740         Allergies:     Allergies   Allergen Reactions    Cephalosporins Hives    Quetiapine Fumarate Other (See Comments)     AFib    Requip [Ropinirole Hcl] Other (See Comments)     Possible irregular heart rythm    Penicillins Hives and Rash     fever       Problem List: Patient Active Problem List   Diagnosis Code    Human immunodeficiency virus (HIV) disease ([address] [facility] B20    Depression F32.9    Insomnia G47.00    Nephrolithiasis N20.0    Condyloma acuminata A63.0    Fatigue R53.83    Neutropenia with fever ([facility] D70.9, R50.81    Sepsis ([address] A41.9    Penicillin allergy Z88.0    Syphilis A53.9    Atrial fibrillation with RVR ([facility]) I48.91    Distal radius fracture S52.509A    Obstructive apnea G47.33    Influenza J11.1    Pneumonia J18.9    SIRS (systemic inflammatory response syndrome) ([facility]) R65.10    HIV (human immunodeficiency virus infection) ([facility]) B20    Elevated LFTs R94.5    Pancytopenia ([facility]) D61.818    AIN (anal intraepithelial neoplasia) anal canal D01.3    RLS (restless legs syndrome) G25.81       Past Medical History:        Diagnosis Date    A-fib ([facility] JULY 4 2014    POSSIBLE REACTION TO A MEDICATION    Depression     Hepatitis C     HIV (human immunodeficiency virus infection) ([facility] [facility]     Hx of blood clots     x 3    Influenza A 3/16/16    Kidney stones     Numbness and tingling in left arm     Pulmonary embolism ([facility] [address] 2007    Left lung    Sleep apnea     under going studies not definite    Syphilis, secondary     Thoracic outlet syndrome        Past Surgical History:        Procedure Laterality Date    ELBOW SURGERY Right     ULNAR NERVE    FIRST RIB REMOVAL      and extra rib removed    KNEE ARTHROSCOPY Left     OTHER SURGICAL HISTORY  06/03/2014    EXAMINATION UNDER ANESTHESIA AND FULGURATION OF ANAL    OTHER SURGICAL HISTORY  10/7/14    EXAMINATION UNDER ANESTHESIA, ANAL BIOPSY    OTHER SURGICAL HISTORY N/A 10/29/2015    RECTAL EXAMINATION UNDER ANESTHESIA AND RANDOM BIOPSIES    OTHER SURGICAL HISTORY  02/21/2017    EXAMNINATION UNDER ANESTHESIA , ANAL BIOPSIES, ANAL ABLATION    [place] REPAIR ROTATOR CUFF,ACUTE Bilateral     Rotator Cuff Repair    RECTAL SURGERY  01/27/2015    RECTAL EXAM UNDER ANESTHESIA AND BIOPSY    SHOULDER SURGERY      bilateral for torn rotator cuffs    SINUS SURGERY      WISDOM TOOTH EXTRACTION      WRIST FRACTURE SURGERY Left 10/28/2014       Social History:    Social History     Tobacco Use    Smoking status: Never Smoker    Smokeless tobacco: Never Used   Substance Use Topics    Alcohol use: Not Currently     Comment: once a month                                Counseling given: Not Answered      Vital Signs (Current):   Vitals:    01/23/20 0736   BP: (!) 166/101   Pulse: 95   Resp: 18   Temp: 98.1 °F (36.7 °C)   TempSrc: Oral   SpO2: 91%   Weight: 210 lb (95.3 kg)   Height: 6' 2\" (1.88 m)                                              BP Readings from Last 3 Encounters:   01/23/20 (!) 166/101   01/13/20 (!) 163/89   04/16/19 138/80       NPO Status: Time of last liquid consumption: 0007                        Time of last solid consumption: 2100                        Date of last liquid consumption: 01/23/20                        Date of last solid food consumption: 01/22/20    BMI:   Wt Readings from Last 3 Encounters:   01/23/20 210 lb (95.3 kg)   01/13/20 217 lb (98.4 kg)   04/16/19 203 lb (92.1 kg)     Body mass index is 26.96 kg/m².     CBC:   Lab Results   Component Value Date    WBC 4.9 02/21/2017    RBC 3.62 02/21/2017    HGB 11.4 02/21/2017    HCT 34.2 02/21/2017    MCV 94.6 02/21/2017    RDW 17.4 02/21/2017     02/21/2017       CMP:   Lab Results   Component Value Date     02/21/2017    K 4.1 02/21/2017     02/21/2017    CO2 21 02/21/2017    BUN 19 02/21/2017    CREATININE 0.8 02/21/2017    GFRAA >60 02/21/2017    GFRAA >60 04/10/2013    AGRATIO 0.9 02/21/2017    LABGLOM >60 02/21/2017    GLUCOSE 86 02/21/2017    PROT 6.4 02/21/2017    PROT 8.3 05/24/2011    CALCIUM 8.9 02/21/2017    BILITOT 4.0 02/21/2017    ALKPHOS 53 02/21/2017     02/21/2017    ALT 58 02/21/2017       POC Tests: No results for input(s): POCGLU, POCNA, POCK, POCCL, POCBUN, POCHEMO, POCHCT in the last 72 hours. Coags:   Lab Results   Component Value Date    PROTIME 13.9 02/21/2017    INR 1.23 02/21/2017    APTT 28.1 04/08/2012       HCG (If Applicable): No results found for: PREGTESTUR, PREGSERUM, HCG, HCGQUANT     ABGs: No results found for: PHART, PO2ART, PBW1KMU, LVD7JDZ, BEART, J0CREYFT     Type & Screen (If Applicable):  No results found for: LABABO, [address]    Anesthesia Evaluation  Patient summary reviewed and Nursing notes reviewed  Airway: Mallampati: II  TM distance: >3 FB   Neck ROM: full  Mouth opening: > = 3 FB Dental: normal exam         Pulmonary:normal exam  breath sounds clear to auscultation  (+) pneumonia: resolved,  sleep apnea: on CPAP,            Patient did not smoke on day of surgery. Cardiovascular:  Exercise tolerance: good (>4 METS),         ECG reviewed  Rhythm: regular  Rate: normal  Echocardiogram reviewed         Beta Blocker:  Not on Beta Blocker         Neuro/Psych:   (+) psychiatric history: stable with treatment            GI/Hepatic/Renal:   (+) hepatitis: C, liver disease:,           Endo/Other: Negative Endo/Other ROS                    Abdominal:       Abdomen: soft. Vascular: negative vascular ROS. Anesthesia Plan      general     ASA 3       Induction: intravenous. MIPS: Postoperative opioids intended and Prophylactic antiemetics administered. Anesthetic plan and risks discussed with patient. Use of blood products discussed with patient whom consented to blood products. Plan discussed with attending and CRNA.     Attending anesthesiologist reviewed and agrees with Pre Eval content              Nancy Street DO   1/23/2020

## 2020-01-23 NOTE — OP NOTE
Operative Note  ______________________________________________________________    Patient: Lorin Castellanos  YOB: 1970  MRN: [id number]  Date of Procedure: 1/23/2020    Pre-Op Diagnosis: Anal dysplasia [K62.82]    Post-Op Diagnosis: Same       Procedure(s):  EXAMINATION UNDER ANESTHESIA, ANAL BIOPSIES, DESTRUCTION OF CHONDYLOMA    Anesthesia: General    Surgeon(s):  Barbie Dunne MD    Assistant: North Lai MD    Estimated Blood Loss (mL): 10 cc     Complications: None    Specimens:   ID Type Source Tests Collected by Time Destination   A : POSTERIOR ANUS Tissue Tissue SURGICAL PATHOLOGY Barbie Dunne MD 1/23/2020 5553    B : RIGHT ANUS Tissue Tissue SURGICAL PATHOLOGY Barbie Dunne MD 1/23/2020 0405    C : LEFT ANUS Tissue Tissue SURGICAL PATHOLOGY Barbie Dunne MD 1/23/2020 6953    D : ANTERIOR ANUS Tissue Tissue SURGICAL PATHOLOGY Barbie Dunne MD 1/23/2020 4426          Findings: a few scattered areas suspicious for dysplasia / small condyloma destroyed. No anal masses. Indications: prior anal dysplasia, new lesions seen in office    OPERATIVE NOTE    After informed consent was obtained the patient was taken to the operating room. General anesthesia was given. Time out was called to confirm key components. The patient was placed in the lithotomy position with appropriate padding. The patient was then prepped and draped in the usual sterile fashion. We saw several tiny condyloma and a few whitish plaques concerning for early dysplasia. We took four quadrant biopsies. Given the enlarged hemorrhoids and some bleeding from the biopsy sites we did not take any further biopsies. The bleeding was stopped by cautery except in the posterior area where we had to place two chromic stitches to stop the bleeding. We then destroyed all remaining dysplastic appearing areas with cautery. Good hemostasis was seen. We injected Exparel for post operative pain control.  Gelfoam was placed in the anus. Dr. Dung Rodriguez performed all portions.      Don Colbert MD  Date: 1/23/2020  Time: 9:32 AM

## 2020-01-29 ENCOUNTER — TELEPHONE (OUTPATIENT)
Dept: SURGERY | Age: 50
End: 2020-01-29

## 2020-01-30 NOTE — TELEPHONE ENCOUNTER
I called the patient and spoke with him today  Discussed path : MARISSA 1-2  See me in office Feb 12 as scheduled  He reports he had some pain over the weekend now improved    Patel Perez M.D.  1/30/20   10:11 AM

## 2020-02-12 ENCOUNTER — OFFICE VISIT (OUTPATIENT)
Dept: SURGERY | Age: 50
End: 2020-02-12

## 2020-02-12 VITALS
SYSTOLIC BLOOD PRESSURE: 169 MMHG | DIASTOLIC BLOOD PRESSURE: 98 MMHG | HEART RATE: 88 BPM | BODY MASS INDEX: 25.42 KG/M2 | WEIGHT: 198 LBS

## 2020-02-12 PROCEDURE — 99024 POSTOP FOLLOW-UP VISIT: CPT | Performed by: SURGERY

## 2020-02-12 NOTE — PROGRESS NOTES
Subjective:       Lorin Castellanos presents to the clinic 2-3 weeks after anal biopsies for AIN    HPI: Doing well. Pain resolved, bleeding resolved. Objective:      BP (!) 169/98 (Site: Left Upper Arm, Position: Sitting, Cuff Size: Medium Adult)   Pulse 88   Wt 198 lb (89.8 kg)   BMI 25.42 kg/m²     General:  alert, appears stated age and cooperative   Abdomen: soft, bowel sounds active, non-tender   Anus:   healing well, no drainage, no new lesions, no erythema       Assessment:      Doing well postoperatively. Plan:      1. Continue any current medications. 2. Wound care discussed. 3. Discussed path AIN 1 and a focal spot AIN 2.  See me 1 year or sooner if symptoms develop     Maria G Hawthorne M.D.  2/12/20   10:19 AM

## 2022-11-18 ENCOUNTER — ANESTHESIA EVENT (OUTPATIENT)
Dept: ENDOSCOPY | Age: 52
End: 2022-11-18
Payer: COMMERCIAL

## 2022-11-21 ENCOUNTER — HOSPITAL ENCOUNTER (OUTPATIENT)
Age: 52
Setting detail: OUTPATIENT SURGERY
Discharge: HOME | End: 2022-11-21
Attending: INTERNAL MEDICINE | Admitting: INTERNAL MEDICINE
Payer: COMMERCIAL

## 2022-11-21 ENCOUNTER — ANESTHESIA (OUTPATIENT)
Dept: ENDOSCOPY | Age: 52
End: 2022-11-21
Payer: COMMERCIAL

## 2022-11-21 VITALS
TEMPERATURE: 98.2 F | HEART RATE: 85 BPM | DIASTOLIC BLOOD PRESSURE: 100 MMHG | HEIGHT: 74 IN | BODY MASS INDEX: 24.26 KG/M2 | OXYGEN SATURATION: 94 % | RESPIRATION RATE: 12 BRPM | SYSTOLIC BLOOD PRESSURE: 157 MMHG | WEIGHT: 189 LBS

## 2022-11-21 DIAGNOSIS — K76.6 PORTAL HYPERTENSION (HCC): ICD-10-CM

## 2022-11-21 PROCEDURE — 88305 TISSUE EXAM BY PATHOLOGIST: CPT

## 2022-11-21 PROCEDURE — 3609012400 HC EGD TRANSORAL BIOPSY SINGLE/MULTIPLE: Performed by: INTERNAL MEDICINE

## 2022-11-21 PROCEDURE — 3700000000 HC ANESTHESIA ATTENDED CARE: Performed by: INTERNAL MEDICINE

## 2022-11-21 PROCEDURE — 88342 IMHCHEM/IMCYTCHM 1ST ANTB: CPT

## 2022-11-21 PROCEDURE — 6370000000 HC RX 637 (ALT 250 FOR IP): Performed by: INTERNAL MEDICINE

## 2022-11-21 PROCEDURE — 2720000010 HC SURG SUPPLY STERILE: Performed by: INTERNAL MEDICINE

## 2022-11-21 PROCEDURE — 3609012300 HC EGD BAND LIGATION ESOPHGEAL/GASTRIC VARICES: Performed by: INTERNAL MEDICINE

## 2022-11-21 PROCEDURE — 3700000001 HC ADD 15 MINUTES (ANESTHESIA): Performed by: INTERNAL MEDICINE

## 2022-11-21 PROCEDURE — 6360000002 HC RX W HCPCS

## 2022-11-21 PROCEDURE — 2580000003 HC RX 258: Performed by: ANESTHESIOLOGY

## 2022-11-21 PROCEDURE — 7100000010 HC PHASE II RECOVERY - FIRST 15 MIN: Performed by: INTERNAL MEDICINE

## 2022-11-21 PROCEDURE — 2709999900 HC NON-CHARGEABLE SUPPLY: Performed by: INTERNAL MEDICINE

## 2022-11-21 PROCEDURE — 7100000011 HC PHASE II RECOVERY - ADDTL 15 MIN: Performed by: INTERNAL MEDICINE

## 2022-11-21 PROCEDURE — 2500000003 HC RX 250 WO HCPCS

## 2022-11-21 RX ORDER — SODIUM CHLORIDE 9 MG/ML
INJECTION, SOLUTION INTRAVENOUS PRN
Status: DISCONTINUED | OUTPATIENT
Start: 2022-11-21 | End: 2022-11-21 | Stop reason: HOSPADM

## 2022-11-21 RX ORDER — LIDOCAINE HYDROCHLORIDE 20 MG/ML
INJECTION, SOLUTION EPIDURAL; INFILTRATION; INTRACAUDAL; PERINEURAL PRN
Status: DISCONTINUED | OUTPATIENT
Start: 2022-11-21 | End: 2022-11-21 | Stop reason: SDUPTHER

## 2022-11-21 RX ORDER — SPIRONOLACTONE 50 MG/1
TABLET, FILM COATED ORAL
COMMUNITY
Start: 2022-09-30

## 2022-11-21 RX ORDER — SODIUM CHLORIDE 0.9 % (FLUSH) 0.9 %
5-40 SYRINGE (ML) INJECTION EVERY 12 HOURS SCHEDULED
Status: DISCONTINUED | OUTPATIENT
Start: 2022-11-21 | End: 2022-11-21 | Stop reason: HOSPADM

## 2022-11-21 RX ORDER — FENTANYL CITRATE 50 UG/ML
50 INJECTION, SOLUTION INTRAMUSCULAR; INTRAVENOUS PRN
Status: DISCONTINUED | OUTPATIENT
Start: 2022-11-21 | End: 2022-11-21 | Stop reason: HOSPADM

## 2022-11-21 RX ORDER — FENTANYL CITRATE 50 UG/ML
50 INJECTION, SOLUTION INTRAMUSCULAR; INTRAVENOUS ONCE
Status: DISCONTINUED | OUTPATIENT
Start: 2022-11-21 | End: 2022-11-21 | Stop reason: HOSPADM

## 2022-11-21 RX ORDER — SODIUM CHLORIDE 0.9 % (FLUSH) 0.9 %
5-40 SYRINGE (ML) INJECTION PRN
Status: DISCONTINUED | OUTPATIENT
Start: 2022-11-21 | End: 2022-11-21 | Stop reason: HOSPADM

## 2022-11-21 RX ORDER — SODIUM CHLORIDE, SODIUM LACTATE, POTASSIUM CHLORIDE, CALCIUM CHLORIDE 600; 310; 30; 20 MG/100ML; MG/100ML; MG/100ML; MG/100ML
INJECTION, SOLUTION INTRAVENOUS CONTINUOUS
Status: DISCONTINUED | OUTPATIENT
Start: 2022-11-21 | End: 2022-11-21 | Stop reason: HOSPADM

## 2022-11-21 RX ORDER — FUROSEMIDE 40 MG/1
TABLET ORAL
COMMUNITY
Start: 2022-09-30

## 2022-11-21 RX ORDER — PANTOPRAZOLE SODIUM 20 MG/1
40 TABLET, DELAYED RELEASE ORAL DAILY
Qty: 90 TABLET | Refills: 1 | Status: SHIPPED | OUTPATIENT
Start: 2022-11-21

## 2022-11-21 RX ORDER — PROPOFOL 10 MG/ML
INJECTION, EMULSION INTRAVENOUS PRN
Status: DISCONTINUED | OUTPATIENT
Start: 2022-11-21 | End: 2022-11-21 | Stop reason: SDUPTHER

## 2022-11-21 RX ORDER — CARVEDILOL 6.25 MG/1
6.25 TABLET ORAL 2 TIMES DAILY
Qty: 60 TABLET | Refills: 5 | Status: SHIPPED | OUTPATIENT
Start: 2022-11-21

## 2022-11-21 RX ADMIN — PROPOFOL 70 MG: 10 INJECTION, EMULSION INTRAVENOUS at 07:39

## 2022-11-21 RX ADMIN — PROPOFOL 50 MG: 10 INJECTION, EMULSION INTRAVENOUS at 07:46

## 2022-11-21 RX ADMIN — ALUMINUM HYDROXIDE, MAGNESIUM HYDROXIDE, AND SIMETHICONE: 200; 200; 20 SUSPENSION ORAL at 08:30

## 2022-11-21 RX ADMIN — PROPOFOL 50 MG: 10 INJECTION, EMULSION INTRAVENOUS at 07:41

## 2022-11-21 RX ADMIN — PROPOFOL 30 MG: 10 INJECTION, EMULSION INTRAVENOUS at 07:48

## 2022-11-21 RX ADMIN — SODIUM CHLORIDE, POTASSIUM CHLORIDE, SODIUM LACTATE AND CALCIUM CHLORIDE: 600; 310; 30; 20 INJECTION, SOLUTION INTRAVENOUS at 06:54

## 2022-11-21 RX ADMIN — LIDOCAINE HYDROCHLORIDE 100 MG: 20 INJECTION, SOLUTION EPIDURAL; INFILTRATION; INTRACAUDAL; PERINEURAL at 07:39

## 2022-11-21 RX ADMIN — PROPOFOL 50 MG: 10 INJECTION, EMULSION INTRAVENOUS at 07:43

## 2022-11-21 ASSESSMENT — PAIN - FUNCTIONAL ASSESSMENT
PAIN_FUNCTIONAL_ASSESSMENT: ACTIVITIES ARE NOT PREVENTED
PAIN_FUNCTIONAL_ASSESSMENT: ACTIVITIES ARE NOT PREVENTED
PAIN_FUNCTIONAL_ASSESSMENT: 0-10

## 2022-11-21 ASSESSMENT — PAIN SCALES - GENERAL
PAINLEVEL_OUTOF10: 6
PAINLEVEL_OUTOF10: 1
PAINLEVEL_OUTOF10: 9

## 2022-11-21 ASSESSMENT — PAIN DESCRIPTION - PAIN TYPE
TYPE: SURGICAL PAIN
TYPE: SURGICAL PAIN

## 2022-11-21 ASSESSMENT — PAIN DESCRIPTION - FREQUENCY: FREQUENCY: CONTINUOUS

## 2022-11-21 ASSESSMENT — PAIN DESCRIPTION - ONSET
ONSET: AWAKENED FROM SLEEP
ONSET: ON-GOING

## 2022-11-21 ASSESSMENT — PAIN DESCRIPTION - LOCATION: LOCATION: THROAT

## 2022-11-21 NOTE — DISCHARGE INSTRUCTIONS
ENDOSCOPY DISCHARGE INSTRUCTIONS:    Call the physician that did your procedure for any questions or concern:    [facility]: [phone]  DR. LUCIA MOORE      ACTIVITY:    There are potential side effects to the medications used for sedation and anesthesia during your procedure. These include:  Dizziness or light-headedness, confusion or memory loss, delayed reaction times, loss of coordination, nausea and vomiting. Because of your increased risk for injury, we ask that you observe the following precautions: For the next 24 hours,  DO NOT operate an automobile, bicycle, motorcycle, , power tools or large equipment of any kind. Do not drink alcohol, sign any legal documents or make any legal decisions for 24 hours. Do not bend your head over lower than your heart. DO sit on the side of bed/couch awhile before getting up. Plan on bedrest or quiet relaxation today. You may resume normal activities in 24 hours. DIET:    Your first meal today should be light, avoiding spicy and fatty foods. If you tolerate this first meal, then you may advance to your regular diet unless otherwise advised by your physician. NORMAL SYMPTOMS:  -Mild sore throat if youve had an EGD   -Gaseous discomfort    NOTIFY YOUR PHYSICIAN IF THESE SYMPTOMS OCCUR:  1. Fever (greater than 100)  5. Increased abdominal bloating  2. Severe pain    6. Excessive bleeding  3. Nausea and vomiting  7. Chest pain                                                                    4. Chills    8. Shortness of breath    ADDITIONAL INSTRUCTIONS:    Biopsy results: Call [address],[facility] for biopsy results in 1 week    Educational Information:         H. Pylori: About This Test  What is it? H. pylori tests are used to check for a Helicobacter pylori bacteria infection in the stomach and upper part of the small intestine. H. pylori can cause peptic ulcers.  But most people with this type of bacteria in their digestive systems do not get ulcers. Different tests may be used to check for an H. pylori infection. Blood antibody test. This checks to see if your body has made antibodies to fight H. pylori bacteria. Urea breath test. It tests your breath to see if you have H. pylori bacteria in your stomach. Stool antigen test. This test looks for substances in your feces (stool) that trigger the immune system to fight an H. pylori infection. (These substances are called H. pylori antigens.)  Stomach biopsy. A small sample (biopsy) is taken from the lining of your stomach and small intestine. The samples are checked for H. pylori. Why is this test done? H. pylori tests are done to:  Find out if an infection with H. pylori bacteria may be causing an ulcer or irritation of the stomach lining (gastritis). Find out if treatment for the infection worked. How do you prepare for the test?  Blood antibody test  You do not need to do anything before you have this test.  Urea breath test, stool antigen test, or stomach biopsy  Medicines you take may change the results of these tests. Be sure to tell your doctor about all the prescription and over-the-counter medicines you take. Your doctor may advise you to stop taking some of your medicines. Urea breath test or stomach biopsy  You will be asked to not eat or drink anything for a certain amount of time before your breath test or stomach biopsy. Follow your doctor's instructions about how long you need to avoid eating and drinking before the test. If you are going to have a stomach biopsy, your doctor will give you instructions on how to prepare. How is the test done? Blood antibody test  A health professional uses a needle to take a blood sample, usually from the arm. Urea breath test  A breath sample is collected when you blow into a balloon or blow bubbles into a bottle of liquid. The health professional will:  Collect a sample of your breath before the test starts.   Give you a capsule or some water to swallow that contains tagged or radioactive material.  Collect more samples of your breath. The samples will be tested to see if they contain material formed when H. pylori comes into contact with the tagged or radioactive material.  Stool antigen test  For this test, you may be asked to collect the stool sample at home. To collect the sample, you need to:  Pass stool into a dry container. Either solid or liquid stools can be collected. Be careful not to get urine or toilet tissue in with the stool sample. Replace the container cap. Label the container with your name, your doctor's name, and the date the sample was collected. Wash your hands well after you collect the sample. Take the sealed container to your doctor's office or to the lab as soon as you can. Stomach biopsy  A procedure called endoscopy is used to collect samples of tissue from the stomach and the first part of the small intestine. The tissue samples are tested in a lab to see if they contain H. pylori. Follow-up care is a key part of your treatment and safety. Be sure to make and go to all appointments, and call your doctor if you are having problems. It's also a good idea to keep a list of the medicines you take. Ask your doctor when you can expect to have your test results. Where can you learn more? Go to [email or web address] and sign in to your [employer] account. Enter S106 in the [facility] box to learn more about \"H. Pylori: About This Test.\"        Variceal Banding: What to Expect at [facility] doctor used a lighted tube (endoscope, or scope) to help fix one or more enlarged veins in your esophagus. That's the tube that carries food from your mouth to your stomach. The enlarged veins are called varices. The doctor placed elastic rings around the veins. The rings look like rubber bands. The bands cut off blood flow through the vein. They help prevent internal bleeding.   You may need to have the procedure several times to control the varices and prevent bleeding. Your doctor will probably check the varices every 3 to 12 months. This care sheet gives you a general idea about how long it will take for you to recover. But each person recovers at a different pace. Follow the steps below to get better as quickly as possible. How can you care for yourself at home? Activity    Rest when you feel tired. Getting enough sleep will help you recover. Avoid strenuous activities, such as bicycle riding, jogging, weight lifting, or aerobic exercise, until your doctor says it is okay. Allow your body to heal. Don't move quickly or lift anything heavy until you are feeling better. You may need to take a day or two off work. It depends on the type of work you do and how you feel. For your safety, do not drive or operate any machinery that could be dangerous. Wait until the medicine wears off and you can think clearly and react easily. Your doctor may suggest sleeping with more pillows so you aren't lying flat. Diet    Do not eat or drink for 2 hours after your procedure. Drink plenty of fluids (unless your doctor has told you not to). Try just liquids for your first meal after the surgery. Then you can have regular food if it feels okay. You might try soft foods until your throat feels better. Do not drink alcohol. It increases your risk of bleeding. It can also make liver damage worse. Tell your doctor if you need help to quit. Counseling, support groups, and sometimes medicines can help you stay sober. Medicines    Your doctor will tell you if and when you can restart your medicines. He or she will also give you instructions about taking any new medicines. Follow-up care is a key part of your treatment and safety. Be sure to make and go to all appointments, and call your doctor if you are having problems.  It's also a good idea to know your test results and keep a list of the medicines you take. When should you call for help? Call 911  anytime you think you may need emergency care. For example, call if:    You passed out (lost consciousness). You are short of breath. Call your doctor now or seek immediate medical care if:    You vomit blood or what looks like coffee grounds. Your stools are maroon or very bloody. You are sick to your stomach or can't keep down fluids. You have pain that does not get better after you take pain medicine. You have a fever. Watch closely for changes in your health, and be sure to contact your doctor if:    Your throat still hurts, food feels like it sticks in your throat, or you have trouble swallowing after a day or two. You do not get better as expected. Current as of: June 6, 2022               Content Version: 13.4  © 1530-1400 [employer]. Care instructions adapted under license by [facility] ([facility]). If you have questions about a medical condition or this instruction, always ask your healthcare professional. [address] any warranty or liability for your use of this information. Please review these discharge instructions this evening or tomorrow for  information you may have forgotten. We want to thank you for choosing the [facility] as your health care provider. We always strive to provide you with excellent care while you are here. You may receive a survey in the mail regarding your care. We would appreciate you taking a few minutes of your time to complete this survey.

## 2022-11-21 NOTE — PROGRESS NOTES
Pt came back from procedure with severe pain. Informed M.d., Gi cocktail was ordered and given. Pt seems to be doing much better and states that gi cocktail helped with pain.

## 2022-11-21 NOTE — ANESTHESIA POSTPROCEDURE EVALUATION
Department of Anesthesiology  Postprocedure Note    Patient: Iqra Garnica  MRN: [id number]  YOB: 1970  Date of evaluation: 11/21/2022      Procedure Summary     Date: 11/21/22 Room / Location: [facility]    Anesthesia Start: 5804 Anesthesia Stop: 7114    Procedures:       EGD BIOPSY      EGD BAND LIGATION Diagnosis:       Portal hypertension ([facility] [address]      (Portal hypertension ([facility] [address] [K76.6])    Surgeons: Aravind Patton MD Responsible Provider: Tramaine Ko DO    Anesthesia Type: MAC ASA Status: 3          Anesthesia Type: No value filed.     Mariangel Phase I: Mariangel Score: 10    Mariangel Phase II: Mariangel Score: 10      Anesthesia Post Evaluation    Patient location during evaluation: PACU  Patient participation: complete - patient participated  Level of consciousness: awake  Pain score: 0  Airway patency: patent  Nausea & Vomiting: no nausea and no vomiting  Complications: no  Cardiovascular status: hemodynamically stable  Respiratory status: acceptable  Hydration status: stable

## 2022-11-21 NOTE — ANESTHESIA PRE PROCEDURE
Department of Anesthesiology  Preprocedure Note       Name:  Lynn Syed   Age:  46 y.o.  :  1970                                          MRN:  [id number]         Date:  2022      Surgeon: Vivian De Leon):  Brenna Wright MD    Procedure: Procedure(s):  ESOPHAGOGASTRODUODENOSCOPY    Medications prior to admission:   Prior to Admission medications    Medication Sig Start Date End Date Taking?  Authorizing Provider   metoprolol tartrate (LOPRESSOR) 25 MG tablet TAKE 1/2 TABLET BY MOUTH TWICE A DAY **MUST CALL MD FOR APPOINTMENT 22  Yes Historical Provider, MD   furosemide (LASIX) 40 MG tablet  22   Historical Provider, MD   spironolactone (ALDACTONE) 50 MG tablet  22   Historical Provider, MD   qpcxk-iuojx-hlhletwn-tenofAF (SYMTUZA) [phone] MG TABS Take 1 tablet by mouth daily 19   Historical Provider, MD   dolutegravir sodium (TIVICAY) 50 MG tablet Take 50 mg by mouth 2 times daily    Historical Provider, MD   Doravirine 100 MG TABS Take 100 mg by mouth daily    Historical Provider, MD   gabapentin (NEURONTIN) 300 MG capsule 2-3 po qHS 17   Daphne Marquez MD   pantoprazole (PROTONIX) 20 MG tablet TAKE ONE TABLET BY MOUTH DAILY 3/9/17   Sandra Grant MD   sulfamethoxazole-trimethoprim (BACTRIM;SEPTRA) 400-80 MG per tablet TAKE ONE TABLET BY MOUTH DAILY 3/14/16   Sandra Grant MD   Nutritional Supplements (THERALITH XR PO) Take 1 tablet by mouth as needed     Historical Provider, MD       Current medications:    Current Facility-Administered Medications   Medication Dose Route Frequency Provider Last Rate Last Admin    lactated ringers infusion   IntraVENous Continuous Dion Peterson  mL/hr at 22 0654 New Bag at 22 0654    sodium chloride flush 0.9 % injection 5-40 mL  5-40 mL IntraVENous 2 times per day Dion Peterson MD        sodium chloride flush 0.9 % injection 5-40 mL  5-40 mL IntraVENous PRN Dion Peterson MD  0.9 % sodium chloride infusion   IntraVENous PRN Winsome Faustin MD           Allergies: Allergies   Allergen Reactions    Cephalosporins Hives    Quetiapine Fumarate Other (See Comments)     AFib    Requip [Ropinirole Hcl] Other (See Comments)     Possible irregular heart rythm    Penicillins Hives and Rash     fever       Problem List:    Patient Active Problem List   Diagnosis Code    Human immunodeficiency virus (HIV) disease ([facility] B20    Depression F32. A    Insomnia G47.00    Nephrolithiasis N20.0    Condyloma acuminata A63.0    Fatigue R53.83    Neutropenia with fever ([facility]) D70.9, R50.81    Sepsis ([facility]) A41.9    Penicillin allergy Z88.0    Syphilis A53.9    Atrial fibrillation with RVR ([facility]) I48.91    Distal radius fracture S52.509A    Obstructive apnea G47.33    Influenza J11.1    Pneumonia J18.9    SIRS (systemic inflammatory response syndrome) ([facility]) R65.10    HIV (human immunodeficiency virus infection) ([facility]) B20    Elevated LFTs R79.89    Pancytopenia ([facility]) D61.818    AIN (anal intraepithelial neoplasia) anal canal K62.82    RLS (restless legs syndrome) G25.81       Past Medical History:        Diagnosis Date    A-fib ([address] JULY 4 2014    POSSIBLE REACTION TO A MEDICATION    Depression     Hepatitis C     [facility]     Hx of blood clots     x 3    Influenza A 3/16/16    IVDU (intravenous drug user) 2007    pt stated 1 time, how he acquired AIDs    Kidney stones     Numbness and tingling in left arm     Pulmonary embolism ([address] 2007    Left lung    Sleep apnea     under going studies not definite    Syphilis, secondary     Thoracic outlet syndrome        Past Surgical History:        Procedure Laterality Date    ELBOW SURGERY Right     ULNAR NERVE    FIRST RIB REMOVAL      and extra rib removed    KNEE ARTHROSCOPY Left     OTHER SURGICAL HISTORY  06/03/2014    EXAMINATION UNDER ANESTHESIA AND FULGURATION OF ANAL    OTHER SURGICAL HISTORY  10/7/14    EXAMINATION UNDER ANESTHESIA, ANAL BIOPSY    OTHER SURGICAL HISTORY N/A 10/29/2015    RECTAL EXAMINATION UNDER ANESTHESIA AND RANDOM BIOPSIES    OTHER SURGICAL HISTORY  02/21/2017    EXAMNINATION UNDER ANESTHESIA , ANAL BIOPSIES, ANAL ABLATION    NV REPAIR ROTATOR CUFF,ACUTE Bilateral     Rotator Cuff Repair    NV SURG EXCISION OF ANAL LESION(S) N/A 1/23/2020    EXAMINATION UNDER ANESTHESIA, ANAL BIOPSIES, DESTRUCTION OF CHONDYLOMA performed by Raudel Johnson MD at [place] [address]  01/27/2015    RECTAL EXAM UNDER ANESTHESIA AND BIOPSY    SHOULDER SURGERY      bilateral for torn rotator cuffs    SINUS SURGERY      WISDOM TOOTH EXTRACTION      WRIST FRACTURE SURGERY Left 10/28/2014       Social History:    Social History     Tobacco Use    Smoking status: Never    Smokeless tobacco: Never   Substance Use Topics    Alcohol use: Not Currently     Comment: once a month                                Counseling given: Not Answered      Vital Signs (Current):   Vitals:    11/21/22 0637   BP: (!) 157/91   Pulse: 88   Resp: 16   Temp: 98.2 °F (36.8 °C)   TempSrc: Temporal   SpO2: (!) 2%   Weight: 189 lb (85.7 kg)   Height: 6' 2\" (1.88 m)                                              BP Readings from Last 3 Encounters:   11/21/22 (!) 157/91   02/12/20 (!) 169/98   01/23/20 (!) 88/46       NPO Status: Time of last liquid consumption: 2359                        Time of last solid consumption: 2359                        Date of last liquid consumption: 11/20/22                        Date of last solid food consumption: 11/20/22    BMI:   Wt Readings from Last 3 Encounters:   11/21/22 189 lb (85.7 kg)   02/12/20 198 lb (89.8 kg)   01/23/20 210 lb (95.3 kg)     Body mass index is 24.27 kg/m².     CBC:   Lab Results   Component Value Date/Time    WBC 4.9 02/21/2017 08:32 AM    RBC 3.62 02/21/2017 08:32 AM    HGB 11.4 02/21/2017 08:32 AM    HCT 34.2 02/21/2017 08:32 AM    MCV 94.6 02/21/2017 08:32 AM    RDW 17.4 02/21/2017 08:32 AM     02/21/2017 08:32 AM       CMP:   Lab Results   Component Value Date/Time     02/21/2017 08:32 AM    K 4.1 02/21/2017 08:32 AM     02/21/2017 08:32 AM    CO2 21 02/21/2017 08:32 AM    BUN 19 02/21/2017 08:32 AM    CREATININE 0.8 02/21/2017 08:32 AM    GFRAA >60 02/21/2017 08:32 AM    GFRAA >60 04/10/2013 11:10 AM    AGRATIO 0.9 02/21/2017 08:32 AM    LABGLOM >60 02/21/2017 08:32 AM    GLUCOSE 86 02/21/2017 08:32 AM    PROT 6.4 02/21/2017 08:32 AM    PROT 8.3 05/24/2011 03:05 PM    CALCIUM 8.9 02/21/2017 08:32 AM    BILITOT 4.0 02/21/2017 08:32 AM    ALKPHOS 53 02/21/2017 08:32 AM     02/21/2017 08:32 AM    ALT 58 02/21/2017 08:32 AM       POC Tests: No results for input(s): POCGLU, POCNA, POCK, POCCL, POCBUN, POCHEMO, POCHCT in the last 72 hours.     Coags:   Lab Results   Component Value Date/Time    PROTIME 15.7 01/23/2020 07:40 AM    INR 1.35 01/23/2020 07:40 AM    APTT 28.1 04/08/2012 03:10 PM       HCG (If Applicable): No results found for: PREGTESTUR, PREGSERUM, HCG, HCGQUANT     ABGs: No results found for: PHART, PO2ART, ARF1VRQ, JJV6NWG, BEART, F1POQQMH     Type & Screen (If Applicable):  No results found for: LABABO, LABRH    Drug/Infectious Status (If Applicable):  Lab Results   Component Value Date/Time    HEPCAB REACTIVE (POSITIVE) Screen 05/15/2013 12:31 PM       COVID-19 Screening (If Applicable): No results found for: COVID19        Anesthesia Evaluation  Patient summary reviewed and Nursing notes reviewed no history of anesthetic complications:   Airway: Mallampati: III  TM distance: >3 FB   Neck ROM: full  Mouth opening: > = 3 FB   Dental: normal exam         Pulmonary:normal exam  breath sounds clear to auscultation  (+) pneumonia: resolved,  recent URI: resolved,  sleep apnea: on noncompliant,                             Cardiovascular:  Exercise tolerance: good (>4 METS),   (+) hypertension:,         Rhythm: regular  Rate: normal                    Neuro/Psych:   (+) psychiatric history:depression/anxiety             GI/Hepatic/Renal:   (+) hepatitis: C, liver disease:,           Endo/Other: Negative Endo/Other ROS                    Abdominal:       Abdomen: soft. Vascular: negative vascular ROS. Other Findings:           Anesthesia Plan      MAC     ASA 3       Induction: intravenous. MIPS: prophylactic pharmacologic antiemetic agents not administered perioperatively for documented reasons. Anesthetic plan and risks discussed with patient. Plan discussed with CRNA.     Attending anesthesiologist reviewed and agrees with Preprocedure content                Bruce Carrington DO   11/21/2022

## 2022-11-21 NOTE — PROCEDURES
EGD PROCEDURE NOTE                                                    Patient: Sri Greene MRN: [id number]     YOB: 1970  Age: 46 y.o. Sex: male    Unit: [facility] ENDOSCOPY Room/Bed: Endo Pool/NONE Location: [address] [place] [address]       Admitting Physician: Reji Rothman    Primary Care Physician: Rachid Samuel      Referring  Physician:      Facility:   [address] [place] [address] [Outpatient]  : Ifeoma Ricardo MD      PROCEDURE:  Esophagogastroduodenoscopy with biopsy, variceal band ligation  Procedure(s):  EGD BIOPSY  EGD BAND LIGATION    Preoperative Diagnosis:   Portal hypertension ([facility] [address] [K76.6]    Post Operative Diagnosis:  Same as documented in Diagnosis    INSTRUMENT:  Olympus  Gastroscope    HISTORY & PHYSICAL:  Patient's history, physical, medications, allergies, labs reviewed prior to procedure. Indication:   Same as the preop diagnosis. AllergiesCephalosporins, Quetiapine fumarate, Requip [ropinirole hcl], and Penicillins   Allergies noted: YES  Vital signs reviewed: YES    ASA: No  found with the name: Fatemeh People:  MAC with anesthesia  see nurse documentation for details    Patient in acceptable condition for procedure:YES  Written informed consent obtained from  patient. . Risks (including but not limited to perforation, bloating, infection, perforation  and bleeding adverse drug reaction missed lesions and aspiration during the procedure requiring medical or surgical management) benefits and alternatives explained and questions answered. The  patient. verbalized understanding. A timeoout procedure was performed. Based on the pre-procedure assessment, including review of the patient's medical history, medications, allergies, and review of systems, patient had been deemed to be an appropriate candidate for sedation as planned above.  Patient was therefore sedated with the medications listed above. Patient was monitored continuously with electrocardiogram tracing, pulse oximetry, blood pressure monitoring, and direct visualization. Under continue IV sedation with close monitoring, endoscope passed in to the 2nd portion of the duodenum by direct visualization. POST OPERATIVE FINDINGS:   ESOPHAGUS: Diaphragmatic hiatus was 43 cm from incisors and GE junction (upper margin of gastric folds) was at 40 cm from incisors. Squamocolumnar junction was at 40 cm from incisors. Mucosa appeared mildly inflamed in the distal esophagus. 3 columns of medium sized esophageal varices noted in the mid and distal esophagus. Marshall Salm STOMACH:  Pylorus patent. Antrum revealed mild diffuse gastritis,  Body, fundus and cardia, including retroflexed views revealed mild diffuse portal hypertensive gastropathy. No evidence of gastric varices on retroflexion. .  Stomach insufflated normally. Gastric antral and body of stomach Bx done to  r/o H. Pylori gastritis. DUODENUM: Duodenal bulb  normal. Descending portion of the duodenum appeared  normal.  EGD scope withdrawn and smart  applied to the tip of the scope. EGD scope reintroduced and 4 bands placed onto the distal esophageal varices 32 to 40 cm level. The patient was then decompressed. Scope was then withdrawn. Patient tolerated procedure well. DIAGNOSIS:   1) medium size esophageal varices status post EV banding x4  2) mild diffuse portal hypertensive gastropathy  3) 2 to 3 cm sliding hiatal hernia  4) No evidence of gastric varices. RECOMMENDATIONS:   1) Coreg 6.25 mg BID  DISCONTINUE METOPROLOL Once start taking Coreg  Rcommend to monitor blood pressure at home and call MD if systolic blood pressure less than 100 or heart rate less than 55.  2)  Full liquid diet for one day followed by soft mechanical diet for 3 days followed by diet as tolerated with the 2 g salt restriction per day.   3) Repeat EGD and esophageal variceal band ligation 6 to 8 weeks. 4)  Protonix 40 mg by mouth daily. 5)  GI Cocktail as directed every 6 hrly as needed  6) Avoid pain medications like Motrin/Advil/ibuprofen/Aleve/naproxen or similar NSAIDs    7) Await Pathology results in next 7 to 10 days  8) F/U with me as Scheduled  . Gastric or Duodenal ulcer present: no  Biopsy done to rule out Helicobacter pylori infection:  yes  LUIS PATH SPECIMEN SENT:  yes  PHOTOGRAPH TAKEN:  yes In MD Reports     COMPLICATIONS DURING PROCEDURE:   None      EBL: None  . DISPOSITION: The patient was sent to the recovery room in stable condition. CC: Primary Care/Referring Physician(s): Zana Pelaez  Patient meets criteria for discharge/transfer:  yes  Results and plan discussed with the patient and otherFriend in detail. Signed By:   Adonay Rosenbaum MD  [facility]  7:51 AM 11/21/2022       \"Please note that some or all of this record was generated using voice recognition software. If there are any questions about the content of this document, please contact the author as some errors of transcription may have occurred. \"

## 2022-11-21 NOTE — H&P
GI Endoscopy Outpatient Procedure H&P    Patient: Lynn Syed MRN: [id number]     YOB: 1970  Age: 46 y.o. Sex: male    Unit: [facility] ENDOSCOPY Room/Bed: Endo Pool/NONE Location: [address] [place] [address]     Referring  Physician: Мария Miles is a 46 y.o. male  here for following procedure:    PROCEDURE:    Procedure(s):  ESOPHAGOGASTRODUODENOSCOPY    PRE OPERATIVE DIAGNOSIS:   Portal hypertension ([facility] [address] [K76.6]    INDICATIONS:   Same as the preop diagnosis. Pt seen and examined. H& P reviewed. 46year old male Follows with Dr. Caterina Car for Management of Cirrhosis  MELD Na 11 on 9/24/22  HIV & HCV Cirrhosis G 1a or b  , HCV Cured, coinfection diagnosed in 2007 s/p completed HCV Rx with Harvoni + RBV 9/29/17 to 12/29/17. HEP C RNA : Not Detected 2/16/18, 11/20/20  on PAZ for HIV and HIV VL 32  on 9/6/22 with Cd4 is 96  ER visit to [address] [place] [address]  shortness of breath and abdominal distention. The patient reports that he has had a 17 pound weight gain  Currently undergoing treatment for secondary syphilis with doxycycline  Started Lasix 40 mg BID, stopped this past Friday due to being up all night urinating. States cannot get any sleep  no further abdominal bloating, shortness of breath or LE Swelling. Down 10 lb since visit in 9/2022. eating well. CT in ER 9/24/22 small to moderate amount of ascites  Denies EtOH use. Denies tobacco.  Evaluated for LT at  few yrs back  Denies GI bleed  C/o Rest less leg syndrome symptoms  f/b [address] neuro, ongoing. Blood work during admission: T. bili 3.0 on admission improved to 1.4 3/5/22. MELD 17 on  3/2/22. TB improved since then from 3 to 1.6  Denies any confusion  appetite \"really good\". Denies any fever/chills. Denies any LE Swelling or swelling around abdomen at present time. Denies any further SOB.   Reports reason he stopped his Lasix is because up every hour overnight with frequent urination. Patient has not gotten any blood w/u since Follow up. Relevant History:  --CT Chest Abdomen and pelvis.: Negative for PE, + for  Esophageal thickening with EV and Portal HTN small to moderate amount of ascites    --3/5/2022 CT AP w/ Contrast:  Progression of diffuse edematous changes in this patient with advanced appearing cirrhosis and portal hypertension including ascites which is relatively small volume and diffuse body wall edema. Circumferential thickening of the rectosigmoid region of  the colon may also be congestive. Directed clinical evaluation is recommended to exclude mass. No bowel obstruction or other acute abnormality detected. Some prominent inguinal nodes greater on the left could be reactive. Clinical follow-up is recommended to exclude a progressive etiology. --CT abdomen with Contrast for abdominal pain: 11/11/18: 1. Cirrhosis and portal hypertension similar to prior exams. 2. Minimal ascites. 3. Non obstructing small right lower pole kidney stones. --EGD: 9/7/18;  4 columns of small esophageal varices that did not need banding. diffuse portal hypertensive gastropathy. 2-3 cm sliding hiatal Hernia. no evidence of gastric varices. --CT 12/7/17: Cirrhosis with portosystemic varices and splenomegaly  --RUQ US 5/2017: No suspicious focal liver lesion  --EGD 6/30/17:  diffuse portal hypertensive gastropathy. moderate gastritis. 2 cm hiatal hernia. No Gastric or Esophageal Varices. --Last Recreational drug use 2 yrs ago (snorting pills); Alcohol use 1 beer/month  --Colonoscopy: never had  --CT abdomen without contrast 3/17/17 at [facility]: When comparison is made to prior examination, there has been further advancement of cirrhotic changes involving the liver. Gallbladder is contracted, difficult to assess on this examination. There is advancement of Splenomegaly. Prominence to the mesenteric vessels likely from cirrhosis with portal hypertension.   Further advancement of perirectal wall thickening and thickening of the pubic sling. HCV G 1a with No resistance-associated variants were identified, completed Rx 12/29/2017. HCV acquired from IVD use in the past  Pt Initially referred by Dr. Favio Barry for further evaluation of hepatitis C. S/p Fulgiration for anal Codyloma by Dr. Kathee Gitelman at [facility] after PICC line for bacteremia. Treated with Eliquis for 6 months. Pt initially followed at [facility] for HCV and never been treated for HCV at [facility]    Other PMHx : Syphilis after treatment, hypertension, A. fib (NSR now, A. fib with RVR in 2014) . Umbilical Hernia repair 32/7053. Recent admission 3/2/22-3/3/2022 for Fever suspected to be Cellulitis LE. MELD 17 3/2/2022. Cirrhosis per CT with PHTN, Never had liver Bx  Rectal thickening on CT and had Bx of anal region for Condyloma by Dr. Юлия Domínguez at [facility]  On HIV treatment with PAZ and managed by Dr. Bob Alicea. Evaluated at [facility] for liver transplant but not listed yet. Mild swelling of the lower extremities. Noncompliant with the salt restricted diet. Prescribed Lasix 20 mg daily but not taking (prescribed 3/18/2022). CT abdomen done for abdominal pain on level on 11/11/18 revealed mild gastritis. History Obtained From:  patient       PMH, PSH , Allergies, Medications reviewed.      Past Medical History:   Diagnosis Date    A-fib ([facility] [address] JULY 4 2014    POSSIBLE REACTION TO A MEDICATION    Depression     Hepatitis C     HIV (human immunodeficiency virus infection) ([address]     Hx of blood clots     x 3    Influenza A 3/16/16    IVDU (intravenous drug user) 2007    pt stated 1 time, how he acquired AIDs    Kidney stones     Numbness and tingling in left arm     Pulmonary embolism ([address] 2007    Left lung    Sleep apnea     under going studies not definite    Syphilis, secondary     Thoracic outlet syndrome        Past Surgical History:   Procedure Laterality Date    ELBOW SURGERY Right     ULNAR NERVE    FIRST RIB REMOVAL and extra rib removed    KNEE ARTHROSCOPY Left     OTHER SURGICAL HISTORY  06/03/2014    EXAMINATION UNDER ANESTHESIA AND FULGURATION OF ANAL    OTHER SURGICAL HISTORY  10/7/14    EXAMINATION UNDER ANESTHESIA, ANAL BIOPSY    OTHER SURGICAL HISTORY N/A 10/29/2015    RECTAL EXAMINATION UNDER ANESTHESIA AND RANDOM BIOPSIES    OTHER SURGICAL HISTORY  02/21/2017    EXAMNINATION UNDER ANESTHESIA , ANAL BIOPSIES, ANAL ABLATION    OK REPAIR ROTATOR CUFF,ACUTE Bilateral     Rotator Cuff Repair    OK SURG EXCISION OF ANAL LESION(S) N/A 1/23/2020    EXAMINATION UNDER ANESTHESIA, ANAL BIOPSIES, DESTRUCTION OF CHONDYLOMA performed by Jonh Morales MD at [address] [facility]  01/27/2015    RECTAL EXAM UNDER ANESTHESIA AND BIOPSY    SHOULDER SURGERY      bilateral for torn rotator cuffs    SINUS SURGERY      WISDOM TOOTH EXTRACTION      WRIST FRACTURE SURGERY Left 10/28/2014       Allergies: Cephalosporins, Quetiapine fumarate, Requip [ropinirole hcl], and Penicillins   Allergies noted: Yes     Medications reviewed. No current facility-administered medications on file prior to encounter.      Current Outpatient Medications on File Prior to Encounter   Medication Sig Dispense Refill    metoprolol tartrate (LOPRESSOR) 25 MG tablet TAKE 1/2 TABLET BY MOUTH TWICE A DAY **MUST CALL MD FOR APPOINTMENT      furosemide (LASIX) 40 MG tablet       spironolactone (ALDACTONE) 50 MG tablet       thpfj-ohkab-djfikwtb-tenofAF (SYMTUZA) [phone] MG TABS Take 1 tablet by mouth daily      dolutegravir sodium (TIVICAY) 50 MG tablet Take 50 mg by mouth 2 times daily      Doravirine 100 MG TABS Take 100 mg by mouth daily      gabapentin (NEURONTIN) 300 MG capsule 2-3 po qHS 90 capsule 5    pantoprazole (PROTONIX) 20 MG tablet TAKE ONE TABLET BY MOUTH DAILY 30 tablet 0    sulfamethoxazole-trimethoprim (BACTRIM;SEPTRA) 400-80 MG per tablet TAKE ONE TABLET BY MOUTH DAILY 30 tablet 9    Nutritional Supplements (THERALITH XR PO) Take 1 tablet by mouth as needed            Social History:  Social History     Tobacco Use    Smoking status: Never    Smokeless tobacco: Never   Substance Use Topics    Alcohol use: Not Currently     Comment: once a month         Family History:   Family History   Problem Relation Age of Onset    Stroke Father     Prostate Cancer Father     Heart Disease Brother         PHYSICAL EXAM:     VITAL SIGNS   BP (!) 157/91   Pulse 88   Temp 98.2 °F (36.8 °C) (Temporal)   Resp 16   Ht 6' 2\" (1.88 m)   Wt 189 lb (85.7 kg)   SpO2 (!) 2%   BMI 24.27 kg/m²  I     Height Height: 6' 2\" (188 cm)   Weight Weight: 189 lb (85.7 kg)   BMI Body mass index is 24.27 kg/m². Vital signs reviewed:Yes  see nurse documentation as well for details    General appearance:   No Acute distress   Lungs: Good diaphragmatic excursion. No use of accessory muscles of respiration noted. Heart:   RRR on monitor  Abdomen:  Soft, nontender, nondistended. Extremities:   Edema : no  Neuro:  No focal deficits. Alert and oriented      LABS   Basic Metabolic Profile Lab Results   Component Value Date/Time     02/21/2017 08:32 AM    K 4.1 02/21/2017 08:32 AM     02/21/2017 08:32 AM    CO2 21 02/21/2017 08:32 AM    BUN 19 02/21/2017 08:32 AM    CREATININE 0.8 02/21/2017 08:32 AM    GLUCOSE 86 02/21/2017 08:32 AM    PHOS 2.7 11/18/2016 12:24 PM      Complete Blood Count Lab Results   Component Value Date    WBC 4.9 02/21/2017    HGB 11.4 (L) 02/21/2017    HGB 11.8 (L) 11/18/2016    HCT 34.2 (L) 02/21/2017    HCT 36.0 (L) 11/18/2016     (L) 02/21/2017      Coagulation Studies Lab Results   Component Value Date    INR 1.35 (H) 01/23/2020    INR 1.23 (H) 02/21/2017        No results found for this or any previous visit. ASA Grade:  ASA 3 - Patient with moderate systemic disease with functional limitations   Per Anesthesia  Mallampati Score: III   Per Anesthesia      ASSESSMENT AND PLAN:    1.   Patient is a 46 y.o. male with above specified procedure planned   MAC  with anesthesia  2. Procedure options, risks and benefits and alternatives available for the procedure with possible intervention  reviewed with patient. Patient expresses understanding and informed consent obtained prior to the procedure. .    Patient in acceptable condition for procedure:  Yes    Chris Beth MD  7:31 AM 11/21/2022

## (undated) DEVICE — PAD,ABDOMINAL,5"X9",ST,LF,25/BX: Brand: MEDLINE INDUSTRIES, INC.

## (undated) DEVICE — GLOVE ORANGE PI 8   MSG9080

## (undated) DEVICE — FORCEPS BX L240CM JAW DIA2.8MM L CAP W/ NDL MIC MESH TOOTH

## (undated) DEVICE — PENCIL ES ULT VAC W TELSCP NOSE EZ CLN BLDE 10FT

## (undated) DEVICE — JEWISH HOSPITAL TURNOVER KIT: Brand: MEDLINE INDUSTRIES, INC.

## (undated) DEVICE — SPONGE,LAP,18"X18",DLX,XR,ST,5/PK,40/PK: Brand: MEDLINE

## (undated) DEVICE — UNDERPANTS INCONT XL 45-70IN KNIT SEAMLESS DSGN COLOR-CODED

## (undated) DEVICE — SMARTBAND LIG KT

## (undated) DEVICE — TRAY PREP DRY W/ PREM GLV 2 APPL 6 SPNG 2 UNDPD 1 OVERWRAP

## (undated) DEVICE — GLOVE ORANGE PI 8 1/2   MSG9085

## (undated) DEVICE — SOLUTION IV 1000ML 0.9% SOD CHL

## (undated) DEVICE — GARMENT,MEDLINE,DVT,INT,CALF,MED, GEN2: Brand: MEDLINE

## (undated) DEVICE — SUTURE CHROMIC GUT SZ 3-0 L27IN ABSRB BRN L26MM SH 1/2 CIR G122H

## (undated) DEVICE — ST FLUFF LG 1 PLY: Brand: DEROYAL

## (undated) DEVICE — STANDARD HYPODERMIC NEEDLE,POLYPROPYLENE HUB: Brand: MONOJECT

## (undated) DEVICE — PLATE ES AD W 9FT CRD 2

## (undated) DEVICE — COVER LT HNDL BLU PLAS

## (undated) DEVICE — SURE SET-DOUBLE BASIN-LF: Brand: MEDLINE INDUSTRIES, INC.

## (undated) DEVICE — GOWN,SIRUS,POLYRNF,BRTHSLV,XLN/XL,20/CS: Brand: MEDLINE